# Patient Record
Sex: FEMALE | Race: WHITE | NOT HISPANIC OR LATINO | Employment: PART TIME | URBAN - METROPOLITAN AREA
[De-identification: names, ages, dates, MRNs, and addresses within clinical notes are randomized per-mention and may not be internally consistent; named-entity substitution may affect disease eponyms.]

---

## 2017-08-15 ENCOUNTER — HOSPITAL ENCOUNTER (OUTPATIENT)
Dept: MAMMOGRAPHY | Facility: CLINIC | Age: 57
Discharge: HOME | End: 2017-08-15
Attending: INTERNAL MEDICINE | Admitting: INTERNAL MEDICINE
Payer: COMMERCIAL

## 2017-08-15 DIAGNOSIS — Z12.31 ENCOUNTER FOR SCREENING MAMMOGRAM FOR HIGH-RISK PATIENT: ICD-10-CM

## 2017-08-15 PROCEDURE — G0202 SCR MAMMO BI INCL CAD: HCPCS

## 2017-08-28 ENCOUNTER — TRANSFERRED RECORDS (OUTPATIENT)
Dept: MULTI SPECIALTY CLINIC | Facility: CLINIC | Age: 57
End: 2017-08-28

## 2017-08-28 LAB
ALT SERPL-CCNC: 25 IU/L (ref 8–45)
AST SERPL-CCNC: 23 IU/L (ref 2–40)
CHOLEST SERPL-MCNC: 265 MG/DL (ref 100–199)
CREAT SERPL-MCNC: 0.99 MG/DL (ref 0.57–1.11)
GFR SERPL CREATININE-BSD FRML MDRD: 58 ML/MIN/1.73M2
GLUCOSE SERPL-MCNC: 84 MG/DL (ref 65–100)
HDLC SERPL-MCNC: 53 MG/DL
LDLC SERPL CALC-MCNC: 197 MG/DL
NONHDLC SERPL-MCNC: 212 MG/DL
PAP-ABSTRACT: NORMAL
POTASSIUM SERPL-SCNC: 4.5 MMOL/L (ref 3.5–5)
TRIGL SERPL-MCNC: 75 MG/DL

## 2018-11-13 ENCOUNTER — HOSPITAL ENCOUNTER (OUTPATIENT)
Dept: MAMMOGRAPHY | Facility: CLINIC | Age: 58
Discharge: HOME | End: 2018-11-13
Attending: INTERNAL MEDICINE | Admitting: INTERNAL MEDICINE
Payer: COMMERCIAL

## 2018-11-13 DIAGNOSIS — Z12.31 VISIT FOR SCREENING MAMMOGRAM: ICD-10-CM

## 2018-11-13 PROCEDURE — 77063 BREAST TOMOSYNTHESIS BI: CPT

## 2019-11-19 ENCOUNTER — HOSPITAL ENCOUNTER (OUTPATIENT)
Dept: MAMMOGRAPHY | Facility: CLINIC | Age: 59
Discharge: HOME | End: 2019-11-19
Attending: INTERNAL MEDICINE | Admitting: INTERNAL MEDICINE
Payer: COMMERCIAL

## 2019-11-19 DIAGNOSIS — Z12.31 VISIT FOR SCREENING MAMMOGRAM: ICD-10-CM

## 2019-11-19 PROCEDURE — 77063 BREAST TOMOSYNTHESIS BI: CPT

## 2020-01-19 PROBLEM — R87.619 ABNORMAL PAP SMEAR: Status: ACTIVE | Noted: 2020-01-19

## 2020-01-19 PROBLEM — E78.5 HYPERLIPIDEMIA LDL GOAL <160: Status: ACTIVE | Noted: 2019-01-21

## 2020-01-19 RX ORDER — PHENOL 1.4 %
10 AEROSOL, SPRAY (ML) MUCOUS MEMBRANE
COMMUNITY
Start: 2019-01-17

## 2020-01-19 RX ORDER — ROSUVASTATIN CALCIUM 5 MG/1
5 TABLET, COATED ORAL DAILY
Refills: 3 | COMMUNITY
Start: 2019-11-01 | End: 2021-01-04

## 2020-01-20 ENCOUNTER — OFFICE VISIT (OUTPATIENT)
Dept: FAMILY MEDICINE | Facility: CLINIC | Age: 60
End: 2020-01-20
Payer: COMMERCIAL

## 2020-01-20 VITALS
RESPIRATION RATE: 18 BRPM | WEIGHT: 169 LBS | TEMPERATURE: 99 F | HEART RATE: 73 BPM | OXYGEN SATURATION: 98 % | DIASTOLIC BLOOD PRESSURE: 68 MMHG | SYSTOLIC BLOOD PRESSURE: 106 MMHG

## 2020-01-20 DIAGNOSIS — Z23 NEED FOR SHINGLES VACCINE: ICD-10-CM

## 2020-01-20 DIAGNOSIS — G47.00 INSOMNIA, UNSPECIFIED TYPE: ICD-10-CM

## 2020-01-20 DIAGNOSIS — E78.5 HYPERLIPIDEMIA LDL GOAL <160: ICD-10-CM

## 2020-01-20 DIAGNOSIS — F41.1 GAD (GENERALIZED ANXIETY DISORDER): Primary | ICD-10-CM

## 2020-01-20 PROCEDURE — 90471 IMMUNIZATION ADMIN: CPT | Performed by: FAMILY MEDICINE

## 2020-01-20 PROCEDURE — 99204 OFFICE O/P NEW MOD 45 MIN: CPT | Mod: 25 | Performed by: FAMILY MEDICINE

## 2020-01-20 PROCEDURE — 90750 HZV VACC RECOMBINANT IM: CPT | Performed by: FAMILY MEDICINE

## 2020-01-20 NOTE — PROGRESS NOTES
Subjective     Brenda H Liddle is a 59 year old female who presents to clinic today for the following health issues:    HPI   New Patient/Transfer of Care    On Fluoxetine for COLT/Depression.  Has had stressful events in her life.   had stroke in 2018.    has drinking/acohol issues that he does not like to address or see AA about.    On statin for HLD. Got side effects with another statin, so had to switch to Crestor--much better tolerated. And lipids have been very well controlled.   Takes Melatonin for insomnia.      Reviewed and updated as needed this visit by Provider  Tobacco  Allergies  Meds  Problems  Med Hx  Surg Hx  Fam Hx         Review of Systems   ROS COMP: CONSTITUTIONAL: NEGATIVE for fever, chills, change in weight  INTEGUMENTARY/SKIN: NEGATIVE for worrisome rashes, moles or lesions  EYES: NEGATIVE for vision changes or irritation  ENT/MOUTH: NEGATIVE for ear, mouth and throat problems  RESP: NEGATIVE for significant cough or SOB  CV: NEGATIVE for chest pain, palpitations or peripheral edema  GI: NEGATIVE for nausea, abdominal pain, heartburn, or change in bowel habits  : NEGATIVE for frequency, dysuria, or hematuria  MUSCULOSKELETAL: NEGATIVE for significant arthralgias or myalgia  NEURO: NEGATIVE for weakness, dizziness or paresthesias  HEME: NEGATIVE for bleeding problems  PSYCHIATRIC: NEGATIVE for changes in mood or affect      Objective    /68   Pulse 73   Temp 99  F (37.2  C) (Tympanic)   Resp 18   Wt 76.7 kg (169 lb)   SpO2 98%   There is no height or weight on file to calculate BMI.  Physical Exam   GENERAL: healthy, alert and no distress  EYES: Eyes grossly normal to inspection, PERRL and conjunctivae and sclerae normal  HENT: ear canals and TM's normal, nose and mouth without ulcers or lesions  NECK: no adenopathy, no asymmetry, masses, or scars and thyroid normal to palpation  RESP: lungs clear to auscultation - no rales, rhonchi or wheezes  CV: regular rate  and rhythm, normal S1 S2, no S3 or S4, no murmur, click or rub, no peripheral edema and peripheral pulses strong  ABDOMEN: soft, nontender, no hepatosplenomegaly, no masses and bowel sounds normal  MS: no gross musculoskeletal defects noted, no edema  SKIN: no suspicious lesions or rashes  NEURO: Normal strength and tone, mentation intact and speech normal  PSYCH: mentation appears normal, affect normal/bright    Diagnostic Test Results:  Labs reviewed in Epic        Assessment & Plan   Problem List Items Addressed This Visit     Hyperlipidemia LDL goal <160    Relevant Medications    rosuvastatin (CRESTOR) 5 MG tablet    Insomnia, unspecified type    COLT (generalized anxiety disorder) - Primary    Relevant Medications    FLUoxetine (PROZAC) 20 MG capsule      Other Visit Diagnoses     Need for shingles vaccine        Relevant Orders    ADMIN 1st VACCINE (Completed)    ZOSTER VACCINE RECOMBINANT ADJUVANTED IM NJX (Completed)         COLT/Depression with insomnia: stable  Continue Fluoxetine (takes 20 mg every other day and it works well for her), will let me know if refills are needed.  RTC in 6 months to re-check med routeinly    On Melatonin for insomnia which helps.    HLD: stable  On Rx Crestor  Last Lipids (10/2019) looked normal    Shingles #2 administered today.     Pap Smear result requested to be abstracted.      See Patient Instructions  Return in about 6 months (around 7/20/2020) for re-check / follow-up, med check.    Steph Flores, [facility]

## 2020-01-20 NOTE — NURSING NOTE
Prior to immunization administration, verified patients identity using patient s name and date of birth. Please see Immunization Activity for additional information.     Screening Questionnaire for Adult Immunization    Are you sick today?   No   Do you have allergies to medications, food, a vaccine component or latex?   No   Have you ever had a serious reaction after receiving a vaccination?   No   Do you have a long-term health problem with heart, lung, kidney, or metabolic disease (e.g., diabetes), asthma, a blood disorder, no spleen, complement component deficiency, a cochlear implant, or a spinal fluid leak?  Are you on long-term aspirin therapy?   No   Do you have cancer, leukemia, HIV/AIDS, or any other immune system problem?   No   Do you have a parent, brother, or sister with an immune system problem?   No   In the past 3 months, have you taken medications that affect  your immune system, such as prednisone, other steroids, or anticancer drugs; drugs for the treatment of rheumatoid arthritis, Crohn s disease, or psoriasis; or have you had radiation treatments?   No   Have you had a seizure, or a brain or other nervous system problem?   No   During the past year, have you received a transfusion of blood or blood    products, or been given immune (gamma) globulin or antiviral drug?   No   For women: Are you pregnant or is there a chance you could become       pregnant during the next month?   No   Have you received any vaccinations in the past 4 weeks?   No     Immunization questionnaire answers were all negative.        Per orders of Dr. Flores, injection of Shingrix given by Una Allen CMA. Patient instructed to remain in clinic for 15 minutes afterwards, and to report any adverse reaction to me immediately.       Screening performed by Una Allen CMA on 1/20/2020 at 1:20 PM.

## 2020-11-22 ENCOUNTER — HEALTH MAINTENANCE LETTER (OUTPATIENT)
Age: 60
End: 2020-11-22

## 2020-11-23 ENCOUNTER — HOSPITAL ENCOUNTER (OUTPATIENT)
Dept: MAMMOGRAPHY | Facility: CLINIC | Age: 60
Discharge: HOME | End: 2020-11-23
Attending: FAMILY MEDICINE | Admitting: FAMILY MEDICINE
Payer: COMMERCIAL

## 2020-11-23 DIAGNOSIS — Z12.31 VISIT FOR SCREENING MAMMOGRAM: ICD-10-CM

## 2020-11-23 PROCEDURE — 77063 BREAST TOMOSYNTHESIS BI: CPT

## 2020-12-29 RX ORDER — ROSUVASTATIN CALCIUM 5 MG/1
5 TABLET, COATED ORAL DAILY
Refills: 3 | OUTPATIENT
Start: 2020-12-29

## 2020-12-29 NOTE — TELEPHONE ENCOUNTER
Routing refill request to provider for review/approval because:  Labs not current:  ldl    please route to  team if patient needs an appointment     Carlee NASH RN BSN  [facility]  [phone]

## 2021-01-27 ENCOUNTER — NURSE TRIAGE (OUTPATIENT)
Dept: NURSING | Facility: CLINIC | Age: 61
End: 2021-01-27

## 2021-01-27 NOTE — TELEPHONE ENCOUNTER
Mariama is going to establish with Dr. Chapin. Today she is reporting that she is having abdominal pain. She had pain 6-8 out of 10 the other night. The pain has lessened somewhat but is not gone. She has bloating and tenderness on the right side of the abdomen. She denies fever, vomiting but does report dark urine as well and she is drinking  Plenty of fluids. Send her to scheduling,   Per protocol she should be seen today in office.   Additional Information    Patient wants to be seen    Protocols used: ABDOMINAL PAIN - FEMALE-A-OH

## 2021-01-28 ENCOUNTER — APPOINTMENT (OUTPATIENT)
Dept: CT IMAGING | Facility: CLINIC | Age: 61
End: 2021-01-28
Attending: EMERGENCY MEDICINE
Payer: COMMERCIAL

## 2021-01-28 ENCOUNTER — HOSPITAL ENCOUNTER (OUTPATIENT)
Facility: CLINIC | Age: 61
Setting detail: OBSERVATION
Discharge: HOME | End: 2021-01-29
Attending: EMERGENCY MEDICINE | Admitting: SURGERY
Payer: COMMERCIAL

## 2021-01-28 ENCOUNTER — OFFICE VISIT (OUTPATIENT)
Dept: FAMILY MEDICINE | Facility: CLINIC | Age: 61
End: 2021-01-28
Payer: COMMERCIAL

## 2021-01-28 VITALS
SYSTOLIC BLOOD PRESSURE: 120 MMHG | OXYGEN SATURATION: 96 % | DIASTOLIC BLOOD PRESSURE: 80 MMHG | HEART RATE: 61 BPM | WEIGHT: 169.2 LBS | TEMPERATURE: 98 F

## 2021-01-28 DIAGNOSIS — R10.31 RIGHT LOWER QUADRANT PAIN: Primary | ICD-10-CM

## 2021-01-28 DIAGNOSIS — K35.30 ACUTE APPENDICITIS WITH LOCALIZED PERITONITIS, WITHOUT PERFORATION, ABSCESS, OR GANGRENE: ICD-10-CM

## 2021-01-28 LAB
ALBUMIN SERPL-MCNC: 3.3 G/DL (ref 3.4–5)
ALBUMIN UR-MCNC: NEGATIVE MG/DL
ALP SERPL-CCNC: 104 U/L (ref 40–150)
ALT SERPL W P-5'-P-CCNC: 42 U/L (ref 0–50)
ANION GAP SERPL CALCULATED.3IONS-SCNC: 6 MMOL/L (ref 3–14)
APPEARANCE UR: CLEAR
AST SERPL W P-5'-P-CCNC: 26 U/L (ref 0–45)
BACTERIA #/AREA URNS HPF: ABNORMAL /HPF
BASOPHILS # BLD AUTO: 0.1 10E9/L (ref 0–0.2)
BASOPHILS NFR BLD AUTO: 0.7 %
BILIRUB SERPL-MCNC: 0.2 MG/DL (ref 0.2–1.3)
BILIRUB UR QL STRIP: NEGATIVE
BUN SERPL-MCNC: 16 MG/DL (ref 7–30)
CALCIUM SERPL-MCNC: 9.3 MG/DL (ref 8.5–10.1)
CHLORIDE SERPL-SCNC: 105 MMOL/L (ref 94–109)
CO2 SERPL-SCNC: 28 MMOL/L (ref 20–32)
COLOR UR AUTO: YELLOW
CREAT SERPL-MCNC: 0.93 MG/DL (ref 0.52–1.04)
DIFFERENTIAL METHOD BLD: NORMAL
EOSINOPHIL # BLD AUTO: 0.2 10E9/L (ref 0–0.7)
EOSINOPHIL NFR BLD AUTO: 2.5 %
ERYTHROCYTE [DISTWIDTH] IN BLOOD BY AUTOMATED COUNT: 12.9 % (ref 10–15)
GFR SERPL CREATININE-BSD FRML MDRD: 68 ML/MIN/{1.73_M2}
GLUCOSE SERPL-MCNC: 88 MG/DL (ref 70–99)
GLUCOSE UR STRIP-MCNC: NEGATIVE MG/DL
HCT VFR BLD AUTO: 38.3 % (ref 35–47)
HGB BLD-MCNC: 12.4 G/DL (ref 11.7–15.7)
HGB UR QL STRIP: NEGATIVE
IMM GRANULOCYTES # BLD: 0 10E9/L (ref 0–0.4)
IMM GRANULOCYTES NFR BLD: 0.3 %
KETONES UR STRIP-MCNC: NEGATIVE MG/DL
LABORATORY COMMENT REPORT: NORMAL
LEUKOCYTE ESTERASE UR QL STRIP: ABNORMAL
LIPASE SERPL-CCNC: 125 U/L (ref 73–393)
LYMPHOCYTES # BLD AUTO: 1.4 10E9/L (ref 0.8–5.3)
LYMPHOCYTES NFR BLD AUTO: 19.7 %
MCH RBC QN AUTO: 27.3 PG (ref 26.5–33)
MCHC RBC AUTO-ENTMCNC: 32.4 G/DL (ref 31.5–36.5)
MCV RBC AUTO: 84 FL (ref 78–100)
MONOCYTES # BLD AUTO: 0.6 10E9/L (ref 0–1.3)
MONOCYTES NFR BLD AUTO: 8.6 %
MUCOUS THREADS #/AREA URNS LPF: PRESENT /LPF
NEUTROPHILS # BLD AUTO: 4.7 10E9/L (ref 1.6–8.3)
NEUTROPHILS NFR BLD AUTO: 68.2 %
NITRATE UR QL: NEGATIVE
NRBC # BLD AUTO: 0 10*3/UL
NRBC BLD AUTO-RTO: 0 /100
PH UR STRIP: 6.5 PH (ref 5–7)
PLATELET # BLD AUTO: 381 10E9/L (ref 150–450)
POTASSIUM SERPL-SCNC: 3.8 MMOL/L (ref 3.4–5.3)
PROT SERPL-MCNC: 7.1 G/DL (ref 6.8–8.8)
RBC # BLD AUTO: 4.55 10E12/L (ref 3.8–5.2)
RBC #/AREA URNS AUTO: <1 /HPF (ref 0–2)
SARS-COV-2 RNA RESP QL NAA+PROBE: NEGATIVE
SODIUM SERPL-SCNC: 139 MMOL/L (ref 133–144)
SOURCE: ABNORMAL
SP GR UR STRIP: 1.01 (ref 1–1.03)
SPECIMEN SOURCE: NORMAL
SQUAMOUS #/AREA URNS AUTO: <1 /HPF (ref 0–1)
UROBILINOGEN UR STRIP-MCNC: 2 MG/DL (ref 0–2)
WBC # BLD AUTO: 6.9 10E9/L (ref 4–11)
WBC #/AREA URNS AUTO: 5 /HPF (ref 0–5)

## 2021-01-28 PROCEDURE — 80053 COMPREHEN METABOLIC PANEL: CPT | Performed by: EMERGENCY MEDICINE

## 2021-01-28 PROCEDURE — 83690 ASSAY OF LIPASE: CPT | Performed by: EMERGENCY MEDICINE

## 2021-01-28 PROCEDURE — 87635 SARS-COV-2 COVID-19 AMP PRB: CPT | Performed by: EMERGENCY MEDICINE

## 2021-01-28 PROCEDURE — 250N000009 HC RX 250: Performed by: EMERGENCY MEDICINE

## 2021-01-28 PROCEDURE — 99207 PR INPT ADMISSION FROM CLINIC: CPT | Performed by: NURSE PRACTITIONER

## 2021-01-28 PROCEDURE — 81001 URINALYSIS AUTO W/SCOPE: CPT | Performed by: EMERGENCY MEDICINE

## 2021-01-28 PROCEDURE — G0378 HOSPITAL OBSERVATION PER HR: HCPCS

## 2021-01-28 PROCEDURE — 74177 CT ABD & PELVIS W/CONTRAST: CPT

## 2021-01-28 PROCEDURE — 96365 THER/PROPH/DIAG IV INF INIT: CPT

## 2021-01-28 PROCEDURE — 250N000011 HC RX IP 250 OP 636: Performed by: EMERGENCY MEDICINE

## 2021-01-28 PROCEDURE — 96368 THER/DIAG CONCURRENT INF: CPT

## 2021-01-28 PROCEDURE — 99285 EMERGENCY DEPT VISIT HI MDM: CPT | Mod: 25

## 2021-01-28 PROCEDURE — 85025 COMPLETE CBC W/AUTO DIFF WBC: CPT | Performed by: EMERGENCY MEDICINE

## 2021-01-28 PROCEDURE — 258N000003 HC RX IP 258 OP 636: Performed by: SURGERY

## 2021-01-28 RX ORDER — CEFTRIAXONE 2 G/1
2 INJECTION, POWDER, FOR SOLUTION INTRAMUSCULAR; INTRAVENOUS ONCE
Status: COMPLETED | OUTPATIENT
Start: 2021-01-28 | End: 2021-01-28

## 2021-01-28 RX ORDER — ACETAMINOPHEN 325 MG/1
650 TABLET ORAL EVERY 4 HOURS PRN
Status: DISCONTINUED | OUTPATIENT
Start: 2021-01-28 | End: 2021-01-29 | Stop reason: HOSPADM

## 2021-01-28 RX ORDER — LIDOCAINE 40 MG/G
CREAM TOPICAL
Status: DISCONTINUED | OUTPATIENT
Start: 2021-01-28 | End: 2021-01-29 | Stop reason: HOSPADM

## 2021-01-28 RX ORDER — SODIUM CHLORIDE, SODIUM LACTATE, POTASSIUM CHLORIDE, CALCIUM CHLORIDE 600; 310; 30; 20 MG/100ML; MG/100ML; MG/100ML; MG/100ML
INJECTION, SOLUTION INTRAVENOUS CONTINUOUS
Status: DISCONTINUED | OUTPATIENT
Start: 2021-01-28 | End: 2021-01-29 | Stop reason: HOSPADM

## 2021-01-28 RX ORDER — ACETAMINOPHEN 650 MG/1
650 SUPPOSITORY RECTAL EVERY 4 HOURS PRN
Status: DISCONTINUED | OUTPATIENT
Start: 2021-01-28 | End: 2021-01-29 | Stop reason: HOSPADM

## 2021-01-28 RX ORDER — PIPERACILLIN SODIUM, TAZOBACTAM SODIUM 3; .375 G/15ML; G/15ML
3.38 INJECTION, POWDER, LYOPHILIZED, FOR SOLUTION INTRAVENOUS EVERY 6 HOURS
Status: DISCONTINUED | OUTPATIENT
Start: 2021-01-29 | End: 2021-01-29 | Stop reason: HOSPADM

## 2021-01-28 RX ORDER — IOPAMIDOL 755 MG/ML
85 INJECTION, SOLUTION INTRAVASCULAR ONCE
Status: COMPLETED | OUTPATIENT
Start: 2021-01-28 | End: 2021-01-28

## 2021-01-28 RX ORDER — MULTIVIT-MIN/IRON/FOLIC ACID/K 18-600-40
2000 CAPSULE ORAL DAILY
COMMUNITY
End: 2022-04-29

## 2021-01-28 RX ORDER — ONDANSETRON 2 MG/ML
4 INJECTION INTRAMUSCULAR; INTRAVENOUS EVERY 6 HOURS PRN
Status: DISCONTINUED | OUTPATIENT
Start: 2021-01-28 | End: 2021-01-29 | Stop reason: HOSPADM

## 2021-01-28 RX ORDER — CHLORAL HYDRATE 500 MG
1 CAPSULE ORAL DAILY
COMMUNITY
End: 2021-06-22

## 2021-01-28 RX ORDER — ONDANSETRON 4 MG/1
4 TABLET, ORALLY DISINTEGRATING ORAL EVERY 6 HOURS PRN
Status: DISCONTINUED | OUTPATIENT
Start: 2021-01-28 | End: 2021-01-29 | Stop reason: HOSPADM

## 2021-01-28 RX ORDER — HYDROMORPHONE HYDROCHLORIDE 1 MG/ML
0.5 INJECTION, SOLUTION INTRAMUSCULAR; INTRAVENOUS; SUBCUTANEOUS
Status: DISCONTINUED | OUTPATIENT
Start: 2021-01-28 | End: 2021-01-29 | Stop reason: HOSPADM

## 2021-01-28 RX ADMIN — SODIUM CHLORIDE, POTASSIUM CHLORIDE, SODIUM LACTATE AND CALCIUM CHLORIDE: 600; 310; 30; 20 INJECTION, SOLUTION INTRAVENOUS at 21:16

## 2021-01-28 RX ADMIN — CEFTRIAXONE SODIUM 2 G: 2 INJECTION, POWDER, FOR SOLUTION INTRAMUSCULAR; INTRAVENOUS at 19:12

## 2021-01-28 RX ADMIN — IOPAMIDOL 85 ML: 755 INJECTION, SOLUTION INTRAVENOUS at 16:57

## 2021-01-28 RX ADMIN — SODIUM CHLORIDE 64 ML: 9 INJECTION, SOLUTION INTRAVENOUS at 16:57

## 2021-01-28 RX ADMIN — METRONIDAZOLE 500 MG: 500 INJECTION, SOLUTION INTRAVENOUS at 19:54

## 2021-01-28 ASSESSMENT — MIFFLIN-ST. JEOR
SCORE: 1383.73
SCORE: 1374.21

## 2021-01-28 ASSESSMENT — ENCOUNTER SYMPTOMS
ABDOMINAL PAIN: 1
NAUSEA: 0
APPETITE CHANGE: 1
VOMITING: 0
FREQUENCY: 0
DIARRHEA: 0
DYSURIA: 0
FEVER: 0

## 2021-01-28 NOTE — H&P (VIEW-ONLY)
"  History   Chief Complaint  Abdominal Pain    HPI   Brenda H Liddle is a 60 year old female with a history of hyperlipidemia who presents for evaluation of abdominal pain. The patient reports that this pain started 5 days ago after eating pizza for dinner. This pain has been relatively persistent since then; however, it has been decreasing in severity. She was seen by her PCP today and they recommended that she go to the ED for CT imaging. Here, her abdominal pain has somewhat resolved. The patient also endorses a decreased appetite and darker urine. She denies any fever, nausea, vomiting, diarrhea, dysuria, difficulty urinating, or urgency.     Review of Systems   Constitutional: Positive for appetite change (decrease). Negative for fever.   Gastrointestinal: Positive for abdominal pain (resolved). Negative for diarrhea, nausea and vomiting.   Genitourinary: Negative for decreased urine volume, dysuria, frequency and urgency.   All other systems reviewed and are negative.    Allergies  The patient has no known allergies.     Medications  Prozac  Crestor    Past Medical History  Hyperlipidemia  Positive HPV test  Anxiety  Insomnia  Depression    Past Surgical History  [place] teeth extraction    Family History  Anxiety  Heart disease  Stroke  Hyperlipidemia   Gallbladder issues    Social History  Presents to the ED alone.   Negative for tobacco use.  Negative for alcohol use.  Negative for drug use.    Physical Exam     Patient Vitals for the past 24 hrs:   BP Temp Temp src Pulse Resp SpO2 Height Weight   01/28/21 1602 (!) 145/63 98.5  F (36.9  C) Temporal 70 18 100 % 1.702 m (5' 7\") 76.7 kg (169 lb)     Physical Exam  Constitutional: Alert, attentive, GCS 15  HENT:    Nose: Nose normal.    Mouth/Throat: Oropharynx is clear, mucous membranes are moist  Eyes: EOM are normal, anicteric, conjugate gaze  CV: regular rate and rhythm; no murmurs  Chest: Effort normal and breath sounds clear without wheezing or rales, " symmetric bilaterally   GI:  RLQ tender to palpation. No distension. No guarding or rebound.    MSK: No LE edema, no tenderness to palpation of BLE.  Neurological: Alert, attentive, moving all extremities equally.   Skin: Skin is warm and dry.    Emergency Department Course   Imaging:  CT Abdomen/Pelvis with IV contrast:   1.  Acute appendicitis. No definite free intraperitoneal air or abscess.   2.  Cholelithiasis. No gallbladder inflammation.   3.  No urinary tract calculi or hydronephrosis. as per radiology.    Laboratory:  CBC: WBC: 6.9, HGB: 12.4, PLT: 381  CMP: Albumin 3.3 (L), o/w WNL (Creatinine: 0.93)  Lipase: 125    UA with Microscopic: leukocyte esterase small (A), bacteria few (A), mucous present (A), o/w WNL      Emergency Department Course:  Reviewed:  I reviewed nursing notes, vitals and past medical history    Assessments:   I physically examined the patient as documented above.     I rechecked the patient and updated her on the findings of her workup.     Consults:    I consulted with Dr. Wellington, on call general surgeon, regarding the patient's history and presentation here in the emergency department.    Interventions:  Medications   cefTRIAXone (ROCEPHIN) 2 g vial to attach to  ml bag for ADULTS or NS 50 ml bag for PEDS (has no administration in time range)   metroNIDAZOLE (FLAGYL) infusion 500 mg (has no administration in time range)   Saline Flush - CT (64 mLs Intravenous Given 21)   iopamidol (ISOVUE-370) solution 85 mL (85 mLs Intravenous Given 21)         Disposition:  The patient was admitted to the hospital under the care of Dr. Wellington.     Impression & Plan   Medical Decision Makin-year-old woman no significant past medical history presenting for 5 days right lower quadrant abdominal pain.  She has some anorexia but no fevers, no chills, vomiting or diarrhea.  On exam she has tenderness exactly at McBurney's point concerning for acute  appendicitis with lower suspicion for diverticulitis or kidney stone.  CT imaging shows uncomplicated appendicitis.  She was given ceftriaxone and Flagyl, discussed with general surgery plan for admission to their service with operative intervention in the morning.  She has not had anything here for pain control or antiemetics.    Diagnosis:    ICD-10-CM    1. Acute appendicitis with localized peritonitis, without perforation, abscess, or gangrene  K35.30        Alex Gutierrez MD  [employer]  7:13 PM 01/28/21     Scribe Disclosure:  I, Serafin Glass, am serving as a scribe at 4:05 PM on 1/28/2021 to document services personally performed by Alex Gutierrez MD based on my observations and the provider's statements to me.      Alex Gutierrez MD  01/28/21 1913

## 2021-01-28 NOTE — ED PROVIDER NOTES
"  History   Chief Complaint  Abdominal Pain    HPI   Brenda H Liddle is a 60 year old female with a history of hyperlipidemia who presents for evaluation of abdominal pain. The patient reports that this pain started 5 days ago after eating pizza for dinner. This pain has been relatively persistent since then; however, it has been decreasing in severity. She was seen by her PCP today and they recommended that she go to the ED for CT imaging. Here, her abdominal pain has somewhat resolved. The patient also endorses a decreased appetite and darker urine. She denies any fever, nausea, vomiting, diarrhea, dysuria, difficulty urinating, or urgency.     Review of Systems   Constitutional: Positive for appetite change (decrease). Negative for fever.   Gastrointestinal: Positive for abdominal pain (resolved). Negative for diarrhea, nausea and vomiting.   Genitourinary: Negative for decreased urine volume, dysuria, frequency and urgency.   All other systems reviewed and are negative.    Allergies  The patient has no known allergies.     Medications  Prozac  Crestor    Past Medical History  Hyperlipidemia  Positive HPV test  Anxiety  Insomnia  Depression    Past Surgical History  [place] teeth extraction    Family History  Anxiety  Heart disease  Stroke  Hyperlipidemia   Gallbladder issues    Social History  Presents to the ED alone.   Negative for tobacco use.  Negative for alcohol use.  Negative for drug use.    Physical Exam     Patient Vitals for the past 24 hrs:   BP Temp Temp src Pulse Resp SpO2 Height Weight   01/28/21 1602 (!) 145/63 98.5  F (36.9  C) Temporal 70 18 100 % 1.702 m (5' 7\") 76.7 kg (169 lb)     Physical Exam  Constitutional: Alert, attentive, GCS 15  HENT:    Nose: Nose normal.    Mouth/Throat: Oropharynx is clear, mucous membranes are moist  Eyes: EOM are normal, anicteric, conjugate gaze  CV: regular rate and rhythm; no murmurs  Chest: Effort normal and breath sounds clear without wheezing or rales, " symmetric bilaterally   GI:  RLQ tender to palpation. No distension. No guarding or rebound.    MSK: No LE edema, no tenderness to palpation of BLE.  Neurological: Alert, attentive, moving all extremities equally.   Skin: Skin is warm and dry.    Emergency Department Course   Imaging:  CT Abdomen/Pelvis with IV contrast:   1.  Acute appendicitis. No definite free intraperitoneal air or abscess.   2.  Cholelithiasis. No gallbladder inflammation.   3.  No urinary tract calculi or hydronephrosis. as per radiology.    Laboratory:  CBC: WBC: 6.9, HGB: 12.4, PLT: 381  CMP: Albumin 3.3 (L), o/w WNL (Creatinine: 0.93)  Lipase: 125    UA with Microscopic: leukocyte esterase small (A), bacteria few (A), mucous present (A), o/w WNL      Emergency Department Course:  Reviewed:  I reviewed nursing notes, vitals and past medical history    Assessments:   I physically examined the patient as documented above.     I rechecked the patient and updated her on the findings of her workup.     Consults:    I consulted with Dr. Wellington, on call general surgeon, regarding the patient's history and presentation here in the emergency department.    Interventions:  Medications   cefTRIAXone (ROCEPHIN) 2 g vial to attach to  ml bag for ADULTS or NS 50 ml bag for PEDS (has no administration in time range)   metroNIDAZOLE (FLAGYL) infusion 500 mg (has no administration in time range)   Saline Flush - CT (64 mLs Intravenous Given 21)   iopamidol (ISOVUE-370) solution 85 mL (85 mLs Intravenous Given 21)         Disposition:  The patient was admitted to the hospital under the care of Dr. Wellington.     Impression & Plan   Medical Decision Makin-year-old woman no significant past medical history presenting for 5 days right lower quadrant abdominal pain.  She has some anorexia but no fevers, no chills, vomiting or diarrhea.  On exam she has tenderness exactly at McBurney's point concerning for acute  appendicitis with lower suspicion for diverticulitis or kidney stone.  CT imaging shows uncomplicated appendicitis.  She was given ceftriaxone and Flagyl, discussed with general surgery plan for admission to their service with operative intervention in the morning.  She has not had anything here for pain control or antiemetics.    Diagnosis:    ICD-10-CM    1. Acute appendicitis with localized peritonitis, without perforation, abscess, or gangrene  K35.30        Alex Gutierrez MD  [employer]  7:13 PM 01/28/21     Scribe Disclosure:  I, Serafin Glass, am serving as a scribe at 4:05 PM on 1/28/2021 to document services personally performed by Alex Gutierrez MD based on my observations and the provider's statements to me.      Alex Gutierrez MD  01/28/21 1913

## 2021-01-28 NOTE — PROGRESS NOTES
Assessment & Plan   Problem List Items Addressed This Visit     None      Visit Diagnoses     Right lower quadrant pain    -  Primary           Concern for appendicitis with peritonitis. Will send to ED for further eval. Attempted to get outpt CT but was unable to schedule.       BALA Boss CNP  [facility] SONIA Leslie is a 60 year old who presents to clinic today for the following health issues     HPI       Chief Complaint   Patient presents with     Abdominal Pain     x 2-3 days      Saturday night, notes pizza and sweets for dinner  No sleep Sat night due to pain  No vomiting, no diarrhea, no fever  No appetite Sunday  Family hx cholecystitis   Worsens with sitting up, lifting legs  Getting better each day  More sensitive on right  Feels bloated  Dark urine, notes less oral intake  Eating bland  Tenderness to RLQ with palpation  LBM today  Discomfort with coughing, laughing, and straing  Similar to previous episode when she ate lots of healthy fat          Review of Systems   Detailed as above      Objective    /80 (BP Location: Left arm, Patient Position: Sitting, Cuff Size: Adult Regular)   Pulse 61   Temp 98  F (36.7  C) (Temporal)   Wt 76.7 kg (169 lb 3.2 oz)   SpO2 96%   Breastfeeding No   There is no height or weight on file to calculate BMI.  Physical Exam  Constitutional:       Appearance: Normal appearance.   Pulmonary:      Effort: Pulmonary effort is normal.   Abdominal:      General: Abdomen is flat. Bowel sounds are normal.      Palpations: Abdomen is soft.      Tenderness: There is abdominal tenderness.      Comments: Point tenderness of right lateral upper abd and RLQ. RLQ was most tender   Neurological:      Mental Status: She is alert.   Psychiatric:         Mood and Affect: Mood normal.            ----- Services Performed by a MEDICAL STUDENT in Presence of ATTENDING Physician-------    Vasiliy Jackson NP Student

## 2021-01-28 NOTE — ED TRIAGE NOTES
Pt has diffuse abdominal pain since Saturday. No appetite, no n/v/d. Went to clinic today and sent here for CT. Having tender touch to RLQ today.

## 2021-01-29 ENCOUNTER — SURGERY (OUTPATIENT)
Age: 61
End: 2021-01-29
Payer: COMMERCIAL

## 2021-01-29 ENCOUNTER — ANESTHESIA EVENT (OUTPATIENT)
Dept: SURGERY | Facility: CLINIC | Age: 61
End: 2021-01-29
Payer: COMMERCIAL

## 2021-01-29 ENCOUNTER — ANESTHESIA (OUTPATIENT)
Dept: SURGERY | Facility: CLINIC | Age: 61
End: 2021-01-29
Payer: COMMERCIAL

## 2021-01-29 VITALS
WEIGHT: 171.1 LBS | HEART RATE: 55 BPM | HEIGHT: 67 IN | TEMPERATURE: 98.1 F | SYSTOLIC BLOOD PRESSURE: 138 MMHG | OXYGEN SATURATION: 100 % | BODY MASS INDEX: 26.85 KG/M2 | RESPIRATION RATE: 12 BRPM | DIASTOLIC BLOOD PRESSURE: 72 MMHG

## 2021-01-29 PROCEDURE — 250N000013 HC RX MED GY IP 250 OP 250 PS 637: Performed by: PHYSICIAN ASSISTANT

## 2021-01-29 PROCEDURE — 258N000001 HC RX 258: Performed by: SURGERY

## 2021-01-29 PROCEDURE — 96376 TX/PRO/DX INJ SAME DRUG ADON: CPT

## 2021-01-29 PROCEDURE — 88304 TISSUE EXAM BY PATHOLOGIST: CPT | Mod: 26 | Performed by: PATHOLOGY

## 2021-01-29 PROCEDURE — 250N000011 HC RX IP 250 OP 636: Performed by: ANESTHESIOLOGY

## 2021-01-29 PROCEDURE — 250N000011 HC RX IP 250 OP 636: Performed by: SURGERY

## 2021-01-29 PROCEDURE — 258N000003 HC RX IP 258 OP 636: Performed by: SURGERY

## 2021-01-29 PROCEDURE — 250N000011 HC RX IP 250 OP 636: Performed by: NURSE ANESTHETIST, CERTIFIED REGISTERED

## 2021-01-29 PROCEDURE — 250N000009 HC RX 250: Performed by: NURSE ANESTHETIST, CERTIFIED REGISTERED

## 2021-01-29 PROCEDURE — 370N000017 HC ANESTHESIA TECHNICAL FEE, PER MIN: Performed by: SURGERY

## 2021-01-29 PROCEDURE — 250N000009 HC RX 250: Performed by: SURGERY

## 2021-01-29 PROCEDURE — 99214 OFFICE O/P EST MOD 30 MIN: CPT | Mod: 57 | Performed by: SURGERY

## 2021-01-29 PROCEDURE — 88304 TISSUE EXAM BY PATHOLOGIST: CPT | Mod: TC | Performed by: SURGERY

## 2021-01-29 PROCEDURE — 710N000009 HC RECOVERY PHASE 1, LEVEL 1, PER MIN: Performed by: SURGERY

## 2021-01-29 PROCEDURE — 272N000001 HC OR GENERAL SUPPLY STERILE: Performed by: SURGERY

## 2021-01-29 PROCEDURE — 360N000076 HC SURGERY LEVEL 3, PER MIN: Performed by: SURGERY

## 2021-01-29 PROCEDURE — 96375 TX/PRO/DX INJ NEW DRUG ADDON: CPT | Mod: 59

## 2021-01-29 PROCEDURE — G0378 HOSPITAL OBSERVATION PER HR: HCPCS

## 2021-01-29 PROCEDURE — 44970 LAPAROSCOPY APPENDECTOMY: CPT | Mod: AS | Performed by: PHYSICIAN ASSISTANT

## 2021-01-29 PROCEDURE — 710N000012 HC RECOVERY PHASE 2, PER MINUTE: Performed by: SURGERY

## 2021-01-29 PROCEDURE — 250N000025 HC SEVOFLURANE, PER MIN: Performed by: SURGERY

## 2021-01-29 PROCEDURE — 44970 LAPAROSCOPY APPENDECTOMY: CPT | Performed by: SURGERY

## 2021-01-29 PROCEDURE — 999N000141 HC STATISTIC PRE-PROCEDURE NURSING ASSESSMENT: Performed by: SURGERY

## 2021-01-29 RX ORDER — EPHEDRINE SULFATE 50 MG/ML
INJECTION, SOLUTION INTRAMUSCULAR; INTRAVENOUS; SUBCUTANEOUS PRN
Status: DISCONTINUED | OUTPATIENT
Start: 2021-01-29 | End: 2021-01-29

## 2021-01-29 RX ORDER — DEXAMETHASONE SODIUM PHOSPHATE 4 MG/ML
INJECTION, SOLUTION INTRA-ARTICULAR; INTRALESIONAL; INTRAMUSCULAR; INTRAVENOUS; SOFT TISSUE PRN
Status: DISCONTINUED | OUTPATIENT
Start: 2021-01-29 | End: 2021-01-29

## 2021-01-29 RX ORDER — FENTANYL CITRATE 50 UG/ML
INJECTION, SOLUTION INTRAMUSCULAR; INTRAVENOUS PRN
Status: DISCONTINUED | OUTPATIENT
Start: 2021-01-29 | End: 2021-01-29

## 2021-01-29 RX ORDER — ONDANSETRON 4 MG/1
4 TABLET, ORALLY DISINTEGRATING ORAL EVERY 30 MIN PRN
Status: DISCONTINUED | OUTPATIENT
Start: 2021-01-29 | End: 2021-01-29 | Stop reason: HOSPADM

## 2021-01-29 RX ORDER — NALOXONE HYDROCHLORIDE 0.4 MG/ML
0.2 INJECTION, SOLUTION INTRAMUSCULAR; INTRAVENOUS; SUBCUTANEOUS
Status: DISCONTINUED | OUTPATIENT
Start: 2021-01-29 | End: 2021-01-29 | Stop reason: HOSPADM

## 2021-01-29 RX ORDER — HYDROMORPHONE HYDROCHLORIDE 1 MG/ML
.3-.5 INJECTION, SOLUTION INTRAMUSCULAR; INTRAVENOUS; SUBCUTANEOUS EVERY 5 MIN PRN
Status: DISCONTINUED | OUTPATIENT
Start: 2021-01-29 | End: 2021-01-29 | Stop reason: HOSPADM

## 2021-01-29 RX ORDER — LIDOCAINE HYDROCHLORIDE 20 MG/ML
INJECTION, SOLUTION INFILTRATION; PERINEURAL PRN
Status: DISCONTINUED | OUTPATIENT
Start: 2021-01-29 | End: 2021-01-29

## 2021-01-29 RX ORDER — FENTANYL CITRATE 50 UG/ML
25-50 INJECTION, SOLUTION INTRAMUSCULAR; INTRAVENOUS
Status: DISCONTINUED | OUTPATIENT
Start: 2021-01-29 | End: 2021-01-29 | Stop reason: HOSPADM

## 2021-01-29 RX ORDER — OXYCODONE HYDROCHLORIDE 5 MG/1
5 TABLET ORAL
Status: COMPLETED | OUTPATIENT
Start: 2021-01-29 | End: 2021-01-29

## 2021-01-29 RX ORDER — NALOXONE HYDROCHLORIDE 0.4 MG/ML
0.4 INJECTION, SOLUTION INTRAMUSCULAR; INTRAVENOUS; SUBCUTANEOUS
Status: DISCONTINUED | OUTPATIENT
Start: 2021-01-29 | End: 2021-01-29 | Stop reason: HOSPADM

## 2021-01-29 RX ORDER — ONDANSETRON 2 MG/ML
INJECTION INTRAMUSCULAR; INTRAVENOUS PRN
Status: DISCONTINUED | OUTPATIENT
Start: 2021-01-29 | End: 2021-01-29

## 2021-01-29 RX ORDER — PROPOFOL 10 MG/ML
INJECTION, EMULSION INTRAVENOUS PRN
Status: DISCONTINUED | OUTPATIENT
Start: 2021-01-29 | End: 2021-01-29

## 2021-01-29 RX ORDER — AMOXICILLIN 250 MG
1-2 CAPSULE ORAL 2 TIMES DAILY PRN
Qty: 20 TABLET | Refills: 0 | Status: SHIPPED | OUTPATIENT
Start: 2021-01-29 | End: 2021-03-10

## 2021-01-29 RX ORDER — ONDANSETRON 2 MG/ML
4 INJECTION INTRAMUSCULAR; INTRAVENOUS EVERY 30 MIN PRN
Status: DISCONTINUED | OUTPATIENT
Start: 2021-01-29 | End: 2021-01-29 | Stop reason: HOSPADM

## 2021-01-29 RX ORDER — PROPOFOL 10 MG/ML
INJECTION, EMULSION INTRAVENOUS CONTINUOUS PRN
Status: DISCONTINUED | OUTPATIENT
Start: 2021-01-29 | End: 2021-01-29

## 2021-01-29 RX ORDER — SODIUM CHLORIDE, SODIUM LACTATE, POTASSIUM CHLORIDE, CALCIUM CHLORIDE 600; 310; 30; 20 MG/100ML; MG/100ML; MG/100ML; MG/100ML
INJECTION, SOLUTION INTRAVENOUS CONTINUOUS
Status: DISCONTINUED | OUTPATIENT
Start: 2021-01-29 | End: 2021-01-29 | Stop reason: HOSPADM

## 2021-01-29 RX ORDER — OXYCODONE HYDROCHLORIDE 5 MG/1
5-10 TABLET ORAL EVERY 4 HOURS PRN
Qty: 12 TABLET | Refills: 0 | Status: SHIPPED | OUTPATIENT
Start: 2021-01-29 | End: 2021-03-10

## 2021-01-29 RX ADMIN — ONDANSETRON 4 MG: 2 INJECTION INTRAMUSCULAR; INTRAVENOUS at 12:00

## 2021-01-29 RX ADMIN — Medication 5 MG: at 09:51

## 2021-01-29 RX ADMIN — PIPERACILLIN SODIUM AND TAZOBACTAM SODIUM 3.38 G: 3; .375 INJECTION, POWDER, LYOPHILIZED, FOR SOLUTION INTRAVENOUS at 02:14

## 2021-01-29 RX ADMIN — PROPOFOL 200 MG: 10 INJECTION, EMULSION INTRAVENOUS at 09:44

## 2021-01-29 RX ADMIN — SODIUM CHLORIDE 1000 ML: 900 IRRIGANT IRRIGATION at 10:00

## 2021-01-29 RX ADMIN — PROPOFOL 30 MCG/KG/MIN: 10 INJECTION, EMULSION INTRAVENOUS at 09:47

## 2021-01-29 RX ADMIN — SUCCINYLCHOLINE CHLORIDE 100 MG: 20 INJECTION, SOLUTION INTRAMUSCULAR; INTRAVENOUS; PARENTERAL at 09:44

## 2021-01-29 RX ADMIN — MIDAZOLAM 2 MG: 1 INJECTION INTRAMUSCULAR; INTRAVENOUS at 09:38

## 2021-01-29 RX ADMIN — DEXAMETHASONE SODIUM PHOSPHATE 4 MG: 4 INJECTION, SOLUTION INTRA-ARTICULAR; INTRALESIONAL; INTRAMUSCULAR; INTRAVENOUS; SOFT TISSUE at 09:57

## 2021-01-29 RX ADMIN — PIPERACILLIN SODIUM AND TAZOBACTAM SODIUM 3.38 G: 3; .375 INJECTION, POWDER, LYOPHILIZED, FOR SOLUTION INTRAVENOUS at 07:53

## 2021-01-29 RX ADMIN — FENTANYL CITRATE 50 MCG: 50 INJECTION, SOLUTION INTRAMUSCULAR; INTRAVENOUS at 10:05

## 2021-01-29 RX ADMIN — LIDOCAINE HYDROCHLORIDE 39 ML: 10; .005 INJECTION, SOLUTION EPIDURAL; INFILTRATION; INTRACAUDAL; PERINEURAL at 10:11

## 2021-01-29 RX ADMIN — ROCURONIUM BROMIDE 20 MG: 10 INJECTION INTRAVENOUS at 10:05

## 2021-01-29 RX ADMIN — ONDANSETRON 4 MG: 2 INJECTION INTRAMUSCULAR; INTRAVENOUS at 10:20

## 2021-01-29 RX ADMIN — SODIUM CHLORIDE, POTASSIUM CHLORIDE, SODIUM LACTATE AND CALCIUM CHLORIDE: 600; 310; 30; 20 INJECTION, SOLUTION INTRAVENOUS at 06:54

## 2021-01-29 RX ADMIN — LIDOCAINE HYDROCHLORIDE 100 MG: 20 INJECTION, SOLUTION INFILTRATION; PERINEURAL at 09:44

## 2021-01-29 RX ADMIN — ROCURONIUM BROMIDE 30 MG: 10 INJECTION INTRAVENOUS at 09:50

## 2021-01-29 RX ADMIN — FENTANYL CITRATE 50 MCG: 0.05 INJECTION, SOLUTION INTRAMUSCULAR; INTRAVENOUS at 11:07

## 2021-01-29 RX ADMIN — OXYCODONE HYDROCHLORIDE 5 MG: 5 TABLET ORAL at 11:27

## 2021-01-29 RX ADMIN — FENTANYL CITRATE 50 MCG: 50 INJECTION, SOLUTION INTRAMUSCULAR; INTRAVENOUS at 09:44

## 2021-01-29 RX ADMIN — SUGAMMADEX 200 MG: 100 INJECTION, SOLUTION INTRAVENOUS at 10:29

## 2021-01-29 NOTE — DISCHARGE INSTRUCTIONS
[facility] - [facility]ULT[facility]  Discharge Instructions: Post-Operative Laparoscopic Appendectomy    ACTIVITY    Expect to feel tired after your surgery.  This will gradually resolve.      Take frequent, short walks and increase your activity gradually.      Avoid strenuous physical activity or heavy lifting greater than 15-20 lbs. for 2-3 weeks.  You may climb stairs.    You may drive without restrictions when you are not using any prescription pain medication and feel comfortable in a car.    You may return to work/school when you are comfortable without any prescription pain medication.    WOUND CARE    You may remove your outer dressing or Band-Aids and shower 48 hours after the surgery.  Pat your incisions dry and leave them open to air.  Re-apply dressing (Band-Aids or gauze/tape) as needed for comfort or drainage.    You may have steri-strips (looks like white tape) on your incision.  You may peel off the steri-strips 2 weeks after your surgery if they have not peeled off on their own.     Do not soak your incisions in a tub or pool for 2 weeks.     Do not apply any lotions, creams, or ointments to your incisions.    A ridge under your incisions is normal and will gradually resolve.    DIET    Start with liquids, then gradually resume your regular diet as tolerated.  Avoid heavy, spicy, and greasy meals for 2-3 days.    Drink plenty of fluids to stay hydrated.    PAIN    Expect some tenderness and discomfort at the incision sites.  Use the prescribed pain medication at your discretion.  Expect gradual resolution of your pain over several days.    You may take ibuprofen with food (unless you have been told not to) or acetaminophen/Tylenol instead of or in addition to your prescribed pain medication.  If you are taking Norco or Percocet, do not take any additional acetaminophen/Tylenol.    Do not drink alcohol or drive while you are taking pain medications.    You may apply ice to your incisions in 20  minute intervals as needed for the next 48 hours.  After that time, consider switching to heat if you prefer.    EXPECTATIONS    Pain medications can cause constipation.  Limit use when possible.  Take over the counter stool softener/stimulant, such as Colace or Senna, 1-2 times a day with plenty of water.  You may take a mild over the counter laxative, such as Miralax or a suppository, as needed.  You make discontinue these medications once you are having regular bowel movements and/or are no longer taking your narcotic pain medication.     You may have shoulder or upper back discomfort due to the gas used in surgery.  This is temporary and should resolve in 48-72 hours.  Short, frequent walks may help with this.    If you are unable to urinate, or feel as though you are not emptying your bladder adequately, we recommend you call our office and/or seek care at an ER or Urgent Care facility if after hours.    FOLLOW UP    Our office will contact you in approximately 2 weeks to check on your progress and answer any questions you may have.  If you are doing well, you will not need to return for a follow up appointment.  If any concerns are identified over the phone, we will help you make an appointment to see a provider.     If you have not received a phone call, have any questions or concerns, or would like to be seen, please call us at [phone] and ask to speak with our nurse.  We are located at [address] [place] [address].    CALL OUR OFFICE -[phone] IF YOU HAVE:     Chills or fever above 101 F.    Increased redness, warmth, or drainage at your incisions.    Significant bleeding.    Pain not relieved by your pain medication or rest.    Increasing pain after the first 48 hours.    Any other concerns or questions.       Revised September 2020    Same Day Surgery Discharge Instructions for  Sedation and General Anesthesia       It's not unusual to feel dizzy, light-headed or faint  for up to 24 hours after surgery or while taking pain medication.  If you have these symptoms: sit for a few minutes before standing and have someone assist you when you get up to walk or use the bathroom.      You should rest and relax for the next 24 hours. We recommend you make arrangements to have an adult stay with you for at least 24 hours after your discharge.  Avoid hazardous and strenuous activity.      DO NOT DRIVE any vehicle or operate mechanical equipment for 24 hours following the end of your surgery.  Even though you may feel normal, your reactions may be affected by the medication you have received.      Do not drink alcoholic beverages for 24 hours following surgery.       Slowly progress to your regular diet as you feel able. It's not unusual to feel nauseated and/or vomit after receiving anesthesia.  If you develop these symptoms, drink clear liquids (apple juice, ginger ale, broth, 7-up, etc. ) until you feel better.  If your nausea and vomiting persists for 24 hours, please notify your surgeon.        All narcotic pain medications, along with inactivity and anesthesia, can cause constipation. Drinking plenty of liquids and increasing fiber intake will help.      For any questions of a medical nature, call your surgeon.      Do not make important decisions for 24 hours.      If you had general anesthesia, you may have a sore throat for a couple of days related to the breathing tube used during surgery.  You may use Cepacol lozenges to help with this discomfort.  If it worsens or if you develop a fever, contact your surgeon.       If you feel your pain is not well managed with the pain medications prescribed by your surgeon, please contact your surgeon's office to let them know so they can address your concerns.       CoVid 19 Information    We want to give you information regarding Covid. Please consult your primary care provider with any questions you might have.     Patient who have symptoms  (cough, fever, or shortness of breath), need to isolate for 7 days from when symptoms started OR 72 hours after fever resolves (without fever reducing medications) AND improvement of respiratory symptoms (whichever is longer).      Isolate yourself at home (in own room/own bathroom if possible)    Do Not allow any visitors    Do Not go to work or school    Do Not go to [employer],  centers, shopping, or other public places.    Do Not shake hands.    Avoid close and intimate contact with others (hugging, kissing).    Follow CDC recommendations for household cleaning of frequently touched services.     After the initial 7 days, continue to isolate yourself from household members as much as possible. To continue decrease the risk of community spread and exposure, you and any members of your household should limit activities in public for 14 days after starting home isolation.     You can reference the following CDC link for helpful home isolation/care tips:  https://www.cdc.gov/coronavirus/2019-ncov/downloads/10Things.pdf    Protect Others:    Cover Your Mouth and Nose with a mask, disposable tissue or wash cloth to avoid spreading germs to others.    Wash your hands and face frequently with soap and water    Call Your Primary Doctor If: Breathing difficulty develops or you become worse.    For more information about COVID19 and options for caring for yourself at home, please visit the CDC website at https://www.cdc.gov/coronavirus/2019-ncov/about/steps-when-sick.html  For more options for care at [facility], please visit our website at [email or web address]      **If you have questions or concerns about your procedure,   call Dr. Mcdonald at [phone]**

## 2021-01-29 NOTE — ED NOTES
"[facility]  ED Nurse Handoff Report    ED Chief complaint: Abdominal Pain      ED Diagnosis:   Final diagnoses:   Acute appendicitis with localized peritonitis, without perforation, abscess, or gangrene       Code Status: Full Code    Allergies: No Known Allergies    Patient Story: Abdominal pain on Saturday and Sunday.  Pain went away.  Seen in the clinic and sent over to r/o appy.  Focused Assessment:  Denies pain. Pain only to palpation.     Treatments and/or interventions provided: IV antibiotics.  Patient's response to treatments and/or interventions: Tolerating    To be done/followed up on inpatient unit:  NA    Does this patient have any cognitive concerns?: NA    Activity level - Baseline/Home:  Independent  Activity Level - Current:   Independent    Patient's Preferred language: English   Needed?: No    Isolation: None  Infection: Not Applicable  Patient tested for COVID 19 prior to admission: YES, negative.   Bariatric?: No    Vital Signs:   Vitals:    01/28/21 1602   BP: (!) 145/63   Pulse: 70   Resp: 18   Temp: 98.5  F (36.9  C)   TempSrc: Temporal   SpO2: 100%   Weight: 76.7 kg (169 lb)   Height: 1.702 m (5' 7\")       Cardiac Rhythm:     Was the PSS-3 completed:   Yes  What interventions are required if any?               Family Comments: .  OBS brochure/video discussed/provided to patient/family: Yes              Name of person given brochure if not patient: NA              Relationship to patient: NA    For the majority of the shift this patient's behavior was Green.   Behavioral interventions performed were NA.    ED NURSE PHONE NUMBER: [phone]    Pearl Mcdonnell RN, BSN  Emergency Department  [facility]         "

## 2021-01-29 NOTE — PLAN OF CARE
A&Ox4. VSS on RA. Denies pain overnight. Denies N/V/D. Voiding. LR running @ 100/hr, receiving Zosyn. NPO w/ ice chips. Up IND. Plan for lap Appendectomy today.

## 2021-01-29 NOTE — PHARMACY-ADMISSION MEDICATION HISTORY
Pharmacy Medication History  Admission medication history interview status for the 1/28/2021  admission is complete. See EPIC admission navigator for prior to admission medications     Location of Interview: Phone  Medication history sources: Patient and Surescripts  Medication history source reliability: Good  Adherence assessment: Good    Significant changes made to the medication list:  Modified fluoxetine     In the past week, patient estimated taking medication this percent of the time: greater than 90%    Additional medication history information:   none    Medication reconciliation completed by provider prior to medication history? No    Time spent in this activity: 10 minutes      Prior to Admission medications    Medication Sig Last Dose Taking? Auth Provider   calcium carbonate-vitamin D (OS-JOE) 500-400 MG-UNIT tablet Take 1 tablet by mouth daily 1/28/2021 at am Yes Reported, Patient   fish oil-omega-3 fatty acids 1000 MG capsule Take 1 capsule by mouth daily Unsure of dose 1/28/2021 at am Yes Unknown, Entered By History   FLUoxetine (PROZAC) 20 MG capsule Take 20 mg by mouth daily  1/27/2021 at am Yes Reported, Patient   Melatonin 10 MG TABS tablet Take 10 mg by mouth nightly as needed for sleep  prn Yes Reported, Patient   Multiple Vitamins-Minerals (MULTIVITAMIN ADULT PO) Take 1 tablet by mouth daily  1/28/2021 at am Yes Reported, Patient   rosuvastatin (CRESTOR) 5 MG tablet Take 1 tablet (5 mg) by mouth daily 1/27/2021 at pm Yes Rianna Chapin MD   Vitamin D, Cholecalciferol, 50 MCG (2000 UT) CAPS Take 2,000 Units by mouth daily  1/28/2021 at am Yes Reported, Patient

## 2021-01-29 NOTE — PLAN OF CARE
Observation goals PRIOR TO DISCHARGE  Comments:   -diagnostic tests and consults completed and resulted-not met  -vital signs normal or at patient baseline-met   Nurse to notify provider when observation goals have been met and patient is ready for discharge.

## 2021-01-29 NOTE — PROGRESS NOTES
Pre-op report given to Lorena JACOBS.  Writer of this note will hang IV abx prior to her transfer off to pre-op.  Patient with no complaints this morning.  Daughter and spouse to come in to pick-up the patient (as her car is parked in the ED ramp).  Patient has remained NPO since midnight.

## 2021-01-29 NOTE — BRIEF OP NOTE
[facility]    Brief Operative Note    Pre-operative diagnosis: Appendicitis, unspecified appendicitis type [K37]  Post-operative diagnosis Contained perforated acute appendicitis     Procedure: Procedure(s):  APPENDECTOMY, LAPAROSCOPIC  Surgeon: Surgeon(s) and Role:     * Linden Mcdonald MD - Primary     * Will Horowitz PA-C - Assisting  Anesthesia: General   Estimated blood loss: 5cc  Drains: None  Specimens:   ID Type Source Tests Collected by Time Destination   A :  Tissue Appendix SURGICAL PATHOLOGY EXAM Linden Mcdonald MD 1/29/2021 10:22 AM      Findings:   Appendix adhered to pelvic abdominal wall, fallopian tube, omentum and ileum therefore infection was locally contained.   Complications: None.  Implants: * No implants in log *      Will Horowitz PA-C

## 2021-01-29 NOTE — H&P
[facility]  General Surgery Consultation         Linden Mcdonald MD    Brenda H Liddle MRN# [id number]   YOB: 1960 Age: 60 year old      Date of Admission:  1/28/2021  Date of Consult: 1/29/2021         Assessment and Plan:   Patient is a 60 year old female with appendicitis    PLAN:  I discussed the pathophysiology of appendicitis including medical and surgical management. Symptoms have been present for 1 week but CT and exam findings are consistent with acute appendicitis.  I would recommend going forward with laparoscopic appendectomy today. I have also discussed the risks, benefits, complications including but not limited to bleeding, infection, possible injury to the bowel or ureter, possible anastomotic dehiscence, possible post operative abscess, possible postoperative MI, PE, or other anesthetic complications. If one of these complications did arise the patient could require further surgery. The patient thoroughly understood the conversation and will sign consent.        Chief Complaint:     Chief Complaint   Patient presents with     Abdominal Pain          History of Present Illness:   Patient is a 60 year old female who I was asked to see by Dr. Gutierrez for evaluation of appendicitis.The patient describes having symptoms for the last 6 days.  Started with severe pain over the weekend.  The pain improved and actually resolved.  She has had waxing and waning symptoms throughout the week.  Was encouraged to go to the urgent care by her daughter due to ongoing symptoms.  She was sent to the ER with concern of appendicitis.  The pain is mainly located in the RLQ area. The patient rates the pain a 1 out of 10 but is very tender. The pain is exacerbated by activity. The pain is relieved by rest. The patient does not have nausea and vomiting. Patient denies fevers, chills, nausea, vomiting, jaundice, changes in stool or urine, headache, SOB, chest pain.          Physical Exam:  "  Blood pressure 123/69, pulse 59, temperature 98  F (36.7  C), resp. rate 16, height 1.702 m (5' 7\"), weight 77.6 kg (171 lb 1.6 oz), SpO2 97 %, not currently breastfeeding.  171 lbs 1.6 oz  General: Generally appears well.  Psych: Alert and Oriented.  Normal affect  Neurological: grossly intact  Eyes: Sclera clear  Respiratory:  Lungs with good air excursion  Cardiovascular:  Normal peripheral pulses  GI: Abdomen Soft Severe tenderness to palpation RLQ No hernias palpated..  Lymphatic/Hematologic/Immune:  No obvious lymphadenopathy.  Integumentary:  No rashes       Past Medical History:   History reviewed. No pertinent past medical history.       Past Surgical History:     Past Surgical History:   Procedure Laterality Date     wisdom teeth removed            Current Medications:           [Auto Hold] piperacillin-tazobactam  3.375 g Intravenous Q6H     [Auto Hold] sodium chloride (PF)  3 mL Intracatheter Q8H     [Auto Hold] acetaminophen, [Auto Hold] acetaminophen, [Auto Hold] HYDROmorphone, [Auto Hold] lidocaine 4%, [Auto Hold] lidocaine (buffered or not buffered), [Auto Hold] melatonin, [Auto Hold] ondansetron **OR** [Auto Hold] ondansetron, [Auto Hold] sodium chloride (PF)       Home Medications:     Prior to Admission medications    Medication Sig Last Dose Taking? Auth Provider   calcium carbonate-vitamin D (OS-JOE) 500-400 MG-UNIT tablet Take 1 tablet by mouth daily 1/28/2021 at am Yes Reported, Patient   fish oil-omega-3 fatty acids 1000 MG capsule Take 1 capsule by mouth daily Unsure of dose 1/28/2021 at am Yes Unknown, Entered By History   FLUoxetine (PROZAC) 20 MG capsule Take 20 mg by mouth daily  1/27/2021 at am Yes Reported, Patient   Melatonin 10 MG TABS tablet Take 10 mg by mouth nightly as needed for sleep  prn Yes Reported, Patient   Multiple Vitamins-Minerals (MULTIVITAMIN ADULT PO) Take 1 tablet by mouth daily  1/28/2021 at am Yes Reported, Patient   rosuvastatin (CRESTOR) 5 MG tablet Take 1 " tablet (5 mg) by mouth daily 1/27/2021 at pm Yes Dari, Rianna Tran MD   Vitamin D, Cholecalciferol, 50 MCG (2000 UT) CAPS Take 2,000 Units by mouth daily  1/28/2021 at am Yes Reported, Patient          Allergies:   No Known Allergies       Family History:   History reviewed. No pertinent family history.      Social History:   Brenda H Liddle  reports that she has never smoked. She has never used smokeless tobacco. She reports previous alcohol use. She reports that she does not use drugs.        Review of Systems:   The 12 point Review of Systems is negative other than noted in the HPI.       Labs/Imaging   All new lab and imaging data was reviewed.   Linden Mcdonald M.D.  [facility]

## 2021-01-29 NOTE — ANESTHESIA PROCEDURE NOTES
Airway   Date/Time: 1/29/2021 9:47 AM   Patient location during procedure: OR  Staff -   Other Anesthesia Staff: Albaro Cruz  Performed By: SRNA    Consent for Airway   Urgency: elective    Indications and Patient Condition  Indications for airway management: felicitas-procedural  Induction type:intravenousMask difficulty assessment: 0 - not attempted    Final Airway Details  Final airway type: endotracheal airway  Successful airway:ETT - single  Endotracheal Airway Details   ETT size (mm): 7.0  Cuffed: yes  Successful intubation technique: direct laryngoscopy  Grade View of Cords: 1  Adjucts: stylet  Measured from: gums/teeth  Secured at (cm): 21  Secured with: pink tape  Bite block used: None    Post intubation assessment   Placement verified by: capnometry, equal breath sounds and chest rise   Number of attempts at approach: 1  Number of other approaches attempted: 0  Secured with:pink tape  Ease of procedure: easy  Dentition: Intact

## 2021-01-29 NOTE — ANESTHESIA PREPROCEDURE EVALUATION
Anesthesia Pre-Procedure Evaluation    Patient: Brenda H Liddle   MRN: [id number] : 1960        Preoperative Diagnosis: Appendicitis, unspecified appendicitis type [K37]   Procedure : Procedure(s):  APPENDECTOMY, LAPAROSCOPIC     No past medical history on file.   Past Surgical History:   Procedure Laterality Date     wisdom teeth removed        No Known Allergies   Social History     Tobacco Use     Smoking status: Never Smoker     Smokeless tobacco: Never Used   Substance Use Topics     Alcohol use: Not Currently      Wt Readings from Last 1 Encounters:   21 77.6 kg (171 lb 1.6 oz)        Anesthesia Evaluation            ROS/MED HX  ENT/Pulmonary:  - neg pulmonary ROS     Neurologic:  - neg neurologic ROS     Cardiovascular:     (+) Dyslipidemia -----    METS/Exercise Tolerance:     Hematologic:  - neg hematologic  ROS     Musculoskeletal:       GI/Hepatic:     (+) appendicitis,  (-) GERD   Renal/Genitourinary:  - neg Renal ROS     Endo:  - neg endo ROS     Psychiatric/Substance Use:     (+) psychiatric history anxiety and depression     Infectious Disease:       Malignancy:       Other:            Physical Exam    Airway        Mallampati: II   TM distance: > 3 FB   Neck ROM: full   Mouth opening: > 3 cm    Respiratory Devices and Support         Dental  no notable dental history         Cardiovascular   cardiovascular exam normal          Pulmonary   pulmonary exam normal                OUTSIDE LABS:  CBC:   Lab Results   Component Value Date    WBC 6.9 2021    HGB 12.4 2021    HCT 38.3 2021     2021     BMP:   Lab Results   Component Value Date     2021    POTASSIUM 3.8 2021    POTASSIUM 4.5 2017    CHLORIDE 105 2021    CO2 28 2021    BUN 16 2021    CR 0.93 2021    CR 0.99 2017    GLC 88 2021    GLC 84 2017     COAGS: No results found for: PTT, INR, FIBR  POC: No results found for: BGM, HCG,  HCGS  HEPATIC:   Lab Results   Component Value Date    ALBUMIN 3.3 (L) 01/28/2021    PROTTOTAL 7.1 01/28/2021    ALT 42 01/28/2021    AST 26 01/28/2021    ALKPHOS 104 01/28/2021    BILITOTAL 0.2 01/28/2021     OTHER:   Lab Results   Component Value Date    JOE 9.3 01/28/2021    LIPASE 125 01/28/2021       Anesthesia Plan     History & Physical Review      ASA Status:  2. NPO Status:  NPO Appropriate. .  Plan for General with Intravenous and RSI induction. Device: ETT Maintenance will be Balanced.         PONV prophylaxis:  Ondansetron (or other 5HT-3) and Dexamethasone or Solumedrol.       Consents  Anesthesia Plan(s) and associated risks, benefits, and realistic alternatives discussed.    Questions answered and patient/representative(s) expressed understanding.    Discussed with:  Patient.             Postoperative Care  Postoperative pain management: IV analgesics.           Harpreet Alegria, DO, DO

## 2021-01-29 NOTE — ANESTHESIA POSTPROCEDURE EVALUATION
Patient: Brenda H Liddle    Procedure(s):  APPENDECTOMY, LAPAROSCOPIC    Diagnosis:Appendicitis, unspecified appendicitis type [K37]  Diagnosis Additional Information: No value filed.    Anesthesia Type:  General    Note:  Disposition: Outpatient   Postop Pain Control: Uneventful            Sign Out: Well controlled pain   PONV:    Neuro/Psych: Uneventful            Sign Out: Acceptable/Baseline neuro status   Airway/Respiratory: Uneventful            Sign Out: Acceptable/Baseline resp. status   CV/Hemodynamics: Uneventful            Sign Out: Acceptable CV status   Other NRE: NONE   DID A NON-ROUTINE EVENT OCCUR? No         Last vitals:  Vitals:    01/29/21 1100 01/29/21 1112 01/29/21 1115   BP: 134/70  129/71   Pulse: 61 57 74   Resp: 20 12 10   Temp:   37  C (98.6  F)   SpO2: 100% 97% 99%       Electronically Signed By: Franklyn Houston MD  January 29, 2021  11:46 AM

## 2021-01-29 NOTE — ANESTHESIA CARE TRANSFER NOTE
Patient: Mariama CONTEH Liddle    Procedure(s):  APPENDECTOMY, LAPAROSCOPIC    Diagnosis: Appendicitis, unspecified appendicitis type [K37]  Diagnosis Additional Information: No value filed.    Anesthesia Type:   General     Note:    Oropharynx: oropharynx clear of all foreign objects  Level of Consciousness: awake  Oxygen Supplementation: face mask  Level of Supplemental Oxygen: 10  Independent Airway: airway patency satisfactory and stable  Dentition: dentition unchanged  Vital Signs Stable: post-procedure vital signs reviewed and stable  Report to RN Given: handoff report given  Patient transferred to: PACU    Handoff Report: Identifed the Patient, Identified the Reponsible Provider, Reviewed the pertinent medical history, Discussed the surgical course, Reviewed Intra-OP anesthesia mangement and issues during anesthesia, Set expectations for post-procedure period and Allowed opportunity for questions and acknowledgement of understanding      Vitals: (Last set prior to Anesthesia Care Transfer)  CRNA VITALS  1/29/2021 1008 - 1/29/2021 1041      1/29/2021             Resp Rate (observed):  16    EKG:  Sinus rhythm        Electronically Signed By: BALA Daniel CRNA  January 29, 2021  10:41 AM

## 2021-01-29 NOTE — DISCHARGE SUMMARY
Admission Date: 1/28/2021  Dismissal Date: 1/29/2021    Diagnoses:  Patient Active Problem List   Diagnosis     Abnormal Pap smear     Cervical high risk HPV (human papillomavirus) test positive     Hyperlipidemia LDL goal <160     Insomnia, unspecified type     COLT (generalized anxiety disorder)     Acute appendicitis with localized peritonitis, without perforation, abscess, or gangrene       Consultants:  None    Procedures Performed:  laparoscopic appendectomy    Brief Clinical History: Please see the H&P    Hospital Course:  Patient had a non-complicated postoperative course. No significant complications occurred and the patient was ready for dismissal to home after meeting all discharge criteria.      Instructions:  Diet: Return to preoperative diet as tolerated.  Activity: Slowly return to regular activity as tolerated.  Medications: Resume home medications   Followup: 2 weeks in surgical office    Questions answered.    Condition on discharge: Stable

## 2021-01-29 NOTE — PLAN OF CARE
RECEIVING UNIT ED HANDOFF REVIEW    ED Nurse Handoff Report was reviewed by: Heather Avila RN on January 28, 2021 at 8:29 PM

## 2021-01-29 NOTE — OP NOTE
PREOPERATIVE DIAGNOSIS: Acute appendicitis.     POSTOPERATIVE DIAGNOSIS: Acute appendicitis with contained perforation.     PROCEDURE: Laparoscopic appendectomy.     SURGEON: Linedn Mcdonald MD     ASSISTANT: Will Horowitz PA-C    ANESTHESIA: GET.     COMPLICATIONS: None.     BLOOD LOSS: Minimal.     FINDINGS: Acute appendicitis without perforation.     INDICATIONS: Mrs. Liddle presented with appendicitis and is now presenting for laparoscopic appendectomy. I explained the risks, benefits, complications including but not limited to bleeding, infection, possible need to open, possible postop hematoma, seroma, bowel or bladder injury, all which require additional procedures. The patient did agree and did sign consent.   .   DETAILS OF PROCEDURE: The patient was brought to the operating room per anesthesia, placed in supine position, intubated without difficulty. Surgical timeout was then performed, verifying the correct surgeon, site, procedure and patient. All in the room were in agreement. The patient was prepped and draped in the usual fashion using ChloraPrep. 1% and 0.25% Marcaine were injected to the supraumbilical area. Skin incision was made, carried down to the fascia. The anterior fascia was grasped with 2 Kocher's sharply incised. An open Deya technique was used to gain entry into the abdominal cavity. A camera was inserted and revealed no trocar injuries. The abdomen was insufflated with pressure of 15, which the patient tolerated well. Two 5's were placed in the suprapubic and left lower quadrant under direct visualization. The appendix was found. It was very inflamed and covered with omentum. Blunt dissection was performed and a small contained abscess was encountered. 10 ml of purulent fluid was suctioned immediately. The base of the cecum and terminal ileum were normal. A mesenteric window was created at the base of the cecum.  A JOAN blue load was fired across this area without issues. Once this was  done, the appendix was freed with some difficulty due to adhesions to the lateral wall and small bowel. The bowel was meticulously bluntly peeled away without injury. The appendiceal artery was delineated and a JOAN white load was fired across this. No bleeding occurred. The appendix was then placed an EndoCatch bag and removed through the umbilical trocar without difficulty. The area was explored. Staple lines were completely intact. There was no bleeding whatsoever. It was irrigated with saline and suctioned.The pelvis showed no acute findings. All trocars were taken out under direct visualization. The fascia was closed with an 0 Vicryl in figure-of-eight fashion. Marcaine 0.25% injected to all the wounds. They were irrigated and closed with 4-0 Monocryl in subcuticular fashion. Steri strips were applied. The patient tolerated the procedure well and was awoken from anesthesia and transferred to PACU in stable condition. All sponge, instrument, and needle counts were correct at the conclusion of the procedure. A physician assistant was needed for camera operation, retraction, and closure.    SWEETIE PAREDES MD     Please CC to PCP

## 2021-02-01 LAB — COPATH REPORT: NORMAL

## 2021-02-03 ENCOUNTER — TELEPHONE (OUTPATIENT)
Dept: SURGERY | Facility: CLINIC | Age: 61
End: 2021-02-03

## 2021-02-03 NOTE — TELEPHONE ENCOUNTER
Name of caller: Patient    Reason for Call:  Call back  Patient wondering if she is ok to get the first dose of vaccine this week? Or too close to surgery date?    Surgeon:  Dr. Mcdonald    Recent Surgery:  Yes.    If yes, when & what type:  1/29/2021    APPENDECTOMY, LAPAROSCOPIC    Best phone number to reach pt at is: [phone]    Ok to leave a message with medical info? Yes.

## 2021-02-03 NOTE — TELEPHONE ENCOUNTER
Called patient and informed her per Dr. Mcdonald it is okay to receive the vaccination at any time.    She verbalized understanding.    Valentine Hutchison RN-BSN

## 2021-02-17 ENCOUNTER — TELEPHONE (OUTPATIENT)
Dept: OTHER | Facility: CLINIC | Age: 61
End: 2021-02-17

## 2021-02-17 NOTE — TELEPHONE ENCOUNTER
[facility]  Post op call note - Laparoscopic appendectomy    Brenda H Liddle was called for an update regarding her recovery.  She underwent a laparoscopic appendectomy by Dr. Mcdonald on 1/29/21.  Today she tells me she is doing well and denies any complaints.  She currently does not need any pain medications.  She is eating a normal diet and her bowels are regular.  The patient states she is slowly resuming normal activity.  She states her wounds are healing well and the steri strips are on.  She denies any erythema or drainage at her wounds.  The patient denies fever/chills, n/v/d, abdominal pain, changes in urination or BM, or wound concerns.     SPECIMEN(S):   Appendix     FINAL DIAGNOSIS:   Appendix, appendectomy:   - Acute appendicitis with periappendicitis.       She was instructed to remove steri strips and continue to keep wounds clean.  She was advised to advance her activity as tolerated with no heavy lifting > 20 lbs or strenuous exercise x 1-2 weeks.  The patient states all of her questions were answered and she understands our discussion.  She agrees to follow up as needed and to call our office with any concerns.    Will Horowitz PA-C  Please route or send letter to:  Primary Care Provider (PCP)

## 2021-03-10 ENCOUNTER — OFFICE VISIT (OUTPATIENT)
Dept: FAMILY MEDICINE | Facility: CLINIC | Age: 61
End: 2021-03-10
Payer: COMMERCIAL

## 2021-03-10 VITALS
HEIGHT: 66 IN | OXYGEN SATURATION: 98 % | SYSTOLIC BLOOD PRESSURE: 136 MMHG | TEMPERATURE: 98.2 F | DIASTOLIC BLOOD PRESSURE: 70 MMHG | HEART RATE: 61 BPM | BODY MASS INDEX: 26.68 KG/M2 | WEIGHT: 166 LBS

## 2021-03-10 DIAGNOSIS — Z00.00 ROUTINE GENERAL MEDICAL EXAMINATION AT A HEALTH CARE FACILITY: Primary | ICD-10-CM

## 2021-03-10 DIAGNOSIS — Z12.4 SCREENING FOR CERVICAL CANCER: ICD-10-CM

## 2021-03-10 DIAGNOSIS — E78.5 HYPERLIPIDEMIA LDL GOAL <130: ICD-10-CM

## 2021-03-10 DIAGNOSIS — F32.0 MILD MAJOR DEPRESSION (H): ICD-10-CM

## 2021-03-10 PROCEDURE — 87624 HPV HI-RISK TYP POOLED RSLT: CPT | Performed by: INTERNAL MEDICINE

## 2021-03-10 PROCEDURE — G0145 SCR C/V CYTO,THINLAYER,RESCR: HCPCS | Performed by: INTERNAL MEDICINE

## 2021-03-10 PROCEDURE — 99213 OFFICE O/P EST LOW 20 MIN: CPT | Mod: 25 | Performed by: INTERNAL MEDICINE

## 2021-03-10 PROCEDURE — 99396 PREV VISIT EST AGE 40-64: CPT | Performed by: INTERNAL MEDICINE

## 2021-03-10 ASSESSMENT — MIFFLIN-ST. JEOR: SCORE: 1344.72

## 2021-03-10 NOTE — PROGRESS NOTES
SUBJECTIVE:   CC: Brenda H Liddle is an 60 year old woman who presents for preventive health visit.       Patient has been advised of split billing requirements and indicates understanding: Yes  Healthy Habits:    Do you get at least three servings of calcium containing foods daily (dairy, green leafy vegetables, etc.)? yes    Amount of exercise or daily activities, outside of work: none    Problems taking medications regularly No    Medication side effects: No    Have you had an eye exam in the past two years? yes    Do you see a dentist twice per year? Not with covid    Do you have sleep apnea, excessive snoring or daytime drowsiness?no    Mariama is a new patient today. She has a history of intermittent mild major depression. In 2004 after several major life events she was put on Fluoxetine for about a year. In 2018 there was again some situational stressors mostly surrounding her  who had a stroke in the setting of alcoholism, and also her mother falling and being hospitalized, needing to sell the house.       Has been on a statin for a few years. Was first on one that caused her to have diarrhea so she was switched to Crestor and has been on this since 2019.    Mariama has two daughters: 24 y/o, Suzie,  for a [employer] company. 21 y/o daughter who is a nurse through [employer] in [place] in the Oncology unit.   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         Today's PHQ-2 Score:   PHQ-2 ( 1999 Pfizer) 1/28/2021 1/20/2020   Q1: Little interest or pleasure in doing things 0 0   Q2: Feeling down, depressed or hopeless 0 0   PHQ-2 Score 0 0       Abuse: Current or Past(Physical, Sexual or Emotional)- No  Do you feel safe in your environment? Yes    Have you ever done Advance Care Planning? (For example, a Health Directive, POLST, or a discussion with a medical provider or your loved ones about  your wishes): No, advance care planning information given to patient to review.  Patient plans to discuss their wishes with loved ones or provider.      Social History     Tobacco Use     Smoking status: Never Smoker     Smokeless tobacco: Never Used   Substance Use Topics     Alcohol use: Not Currently     If you drink alcohol do you typically have >3 drinks per day or >7 drinks per week? No                     Reviewed orders with patient.  Reviewed health maintenance and updated orders accordingly - Yes  BP Readings from Last 3 Encounters:   03/10/21 136/70   01/29/21 138/72   01/28/21 120/80    Wt Readings from Last 3 Encounters:   03/10/21 75.3 kg (166 lb)   01/28/21 77.6 kg (171 lb 1.6 oz)   01/28/21 76.7 kg (169 lb 3.2 oz)                    Breast CA Risk Screening:  No flowsheet data found.    Mammogram Screening: Recommended mammography every 1-2 years with patient discussion and risk factor consideration  Pertinent mammograms are reviewed under the imaging tab.    Pertinent mammograms are reviewed under the imaging tab.  History of abnormal Pap smear: NO - age 30-65 PAP every 5 years with negative HPV co-testing recommended     Reviewed and updated as needed this visit by clinical staff  Tobacco  Allergies  Meds   Med Hx  Surg Hx  Fam Hx  Soc Hx        Reviewed and updated as needed this visit by Provider                    ROS:  CONSTITUTIONAL: NEGATIVE for fever, chills, change in weight  INTEGUMENTARY/SKIN: NEGATIVE for worrisome rashes, moles or lesions  EYES: NEGATIVE for vision changes or irritation  ENT: NEGATIVE for ear, mouth and throat problems  RESP: NEGATIVE for significant cough or SOB  BREAST: NEGATIVE for masses, tenderness or discharge  CV: NEGATIVE for chest pain, palpitations or peripheral edema  GI: NEGATIVE for nausea, abdominal pain, heartburn, or change in bowel habits  : NEGATIVE for unusual urinary or vaginal symptoms. No vaginal bleeding.  MUSCULOSKELETAL: NEGATIVE for  "significant arthralgias or myalgia  NEURO: NEGATIVE for weakness, dizziness or paresthesias  PSYCHIATRIC: NEGATIVE for changes in mood or affect     OBJECTIVE:   /70 (Cuff Size: Adult Large)   Pulse 61   Temp 98.2  F (36.8  C) (Tympanic)   Ht 1.676 m (5' 6\")   Wt 75.3 kg (166 lb)   SpO2 98%   BMI 26.79 kg/m    EXAM:  GENERAL APPEARANCE: healthy, alert and no distress  EYES: Eyes grossly normal to inspection, PERRL and conjunctivae and sclerae normal  HENT: ear canals and TM's normal, nose and mouth without ulcers or lesions, oropharynx clear and oral mucous membranes moist  NECK: no adenopathy, no asymmetry, masses, or scars and thyroid normal to palpation  RESP: lungs clear to auscultation - no rales, rhonchi or wheezes  BREAST: normal without masses, tenderness or nipple discharge and no palpable axillary masses or adenopathy  CV: regular rate and rhythm, normal S1 S2, no S3 or S4, no murmur, click or rub, no peripheral edema and peripheral pulses strong  ABDOMEN: soft, nontender, no hepatosplenomegaly, no masses and bowel sounds normal   (female): normal female external genitalia, normal urethral meatus, vaginal mucosal atrophy noted, normal cervix, adnexae, and uterus without masses or abnormal discharge  MS: no musculoskeletal defects are noted and gait is age appropriate without ataxia  SKIN: no suspicious lesions or rashes  NEURO: Normal strength and tone, sensory exam grossly normal, mentation intact and speech normal  PSYCH: mentation appears normal and affect normal/bright    Diagnostic Test Results:  Labs reviewed in Epic    ASSESSMENT/PLAN:   1. Routine general medical examination at a health care facility  New patient today, updated medical and family history.  reviewed. Pap smear completed today.   - Glucose; Future  - Hemoglobin; Future    2. Hyperlipidemia LDL goal <130  Continue Crestor 5 mg daily. Fasting labs ordered.   - Lipid panel reflex to direct LDL Fasting; Future  - ALT; " "Future    3. Mild major depression (H)  Doing well on Fluoxetine. No reason to discontinue now as Mariama feels it is working well and still has some things in her life that are causing stress. Reviewed benefits and will readdress either later this summer or summer of 2022, whenever she is ready.     4. Screening for cervical cancer  - HPV High Risk Types DNA Cervical  - Pap imaged thin layer screen with HPV - recommended age 30 - 65 years (select HPV order below)    Patient has been advised of split billing requirements and indicates understanding: Yes  COUNSELING:   Reviewed preventive health counseling, as reflected in patient instructions    Estimated body mass index is 26.8 kg/m  as calculated from the following:    Height as of 1/28/21: 1.702 m (5' 7\").    Weight as of 1/28/21: 77.6 kg (171 lb 1.6 oz).        She reports that she has never smoked. She has never used smokeless tobacco.      Counseling Resources:  ATP IV Guidelines  Pooled Cohorts Equation Calculator  Breast Cancer Risk Calculator  BRCA-Related Cancer Risk Assessment: FHS-7 Tool  FRAX Risk Assessment  ICSI Preventive Guidelines  Dietary Guidelines for Americans, 2010  USDA's MyPlate  ASA Prophylaxis  Lung CA Screening    Rianna Chapin MD  [facility] [place]  "

## 2021-03-15 LAB
COPATH REPORT: NORMAL
PAP: NORMAL

## 2021-03-18 LAB
FINAL DIAGNOSIS: NORMAL
HPV HR 12 DNA CVX QL NAA+PROBE: NEGATIVE
HPV16 DNA SPEC QL NAA+PROBE: NEGATIVE
HPV18 DNA SPEC QL NAA+PROBE: NEGATIVE
SPECIMEN DESCRIPTION: NORMAL
SPECIMEN SOURCE CVX/VAG CYTO: NORMAL

## 2021-03-20 DIAGNOSIS — Z00.00 ROUTINE GENERAL MEDICAL EXAMINATION AT A HEALTH CARE FACILITY: ICD-10-CM

## 2021-03-20 DIAGNOSIS — E78.5 HYPERLIPIDEMIA LDL GOAL <130: ICD-10-CM

## 2021-03-20 LAB
ALT SERPL W P-5'-P-CCNC: 28 U/L (ref 0–50)
CHOLEST SERPL-MCNC: 187 MG/DL
GLUCOSE SERPL-MCNC: 88 MG/DL (ref 70–99)
HDLC SERPL-MCNC: 61 MG/DL
HGB BLD-MCNC: 14.4 G/DL (ref 11.7–15.7)
LDLC SERPL CALC-MCNC: 108 MG/DL
NONHDLC SERPL-MCNC: 126 MG/DL
TRIGL SERPL-MCNC: 92 MG/DL

## 2021-03-20 PROCEDURE — 80061 LIPID PANEL: CPT | Performed by: INTERNAL MEDICINE

## 2021-03-20 PROCEDURE — 84460 ALANINE AMINO (ALT) (SGPT): CPT | Performed by: INTERNAL MEDICINE

## 2021-03-20 PROCEDURE — 36415 COLL VENOUS BLD VENIPUNCTURE: CPT | Performed by: INTERNAL MEDICINE

## 2021-03-20 PROCEDURE — 85018 HEMOGLOBIN: CPT | Performed by: INTERNAL MEDICINE

## 2021-03-20 PROCEDURE — 82947 ASSAY GLUCOSE BLOOD QUANT: CPT | Performed by: INTERNAL MEDICINE

## 2021-03-27 DIAGNOSIS — E78.5 HYPERLIPIDEMIA LDL GOAL <160: ICD-10-CM

## 2021-03-29 DIAGNOSIS — F41.1 GAD (GENERALIZED ANXIETY DISORDER): Primary | ICD-10-CM

## 2021-03-30 RX ORDER — ROSUVASTATIN CALCIUM 5 MG/1
TABLET, COATED ORAL
Qty: 90 TABLET | Refills: 3 | Status: SHIPPED | OUTPATIENT
Start: 2021-03-30 | End: 2022-03-21

## 2021-03-30 NOTE — TELEPHONE ENCOUNTER
Routing refill request to provider for review/approval because:  Medication is reported/historical  Mikayla Escobar RN

## 2021-06-11 DIAGNOSIS — Z11.59 ENCOUNTER FOR SCREENING FOR OTHER VIRAL DISEASES: ICD-10-CM

## 2021-06-22 ENCOUNTER — OFFICE VISIT (OUTPATIENT)
Dept: FAMILY MEDICINE | Facility: CLINIC | Age: 61
End: 2021-06-22
Payer: COMMERCIAL

## 2021-06-22 VITALS
DIASTOLIC BLOOD PRESSURE: 70 MMHG | SYSTOLIC BLOOD PRESSURE: 108 MMHG | OXYGEN SATURATION: 100 % | TEMPERATURE: 97.7 F | WEIGHT: 169 LBS | BODY MASS INDEX: 27.28 KG/M2 | RESPIRATION RATE: 16 BRPM | HEART RATE: 64 BPM

## 2021-06-22 DIAGNOSIS — Z01.818 PREOP GENERAL PHYSICAL EXAM: Primary | ICD-10-CM

## 2021-06-22 DIAGNOSIS — H02.831 DERMATOCHALASIS OF RIGHT UPPER EYELID: ICD-10-CM

## 2021-06-22 DIAGNOSIS — H02.834 DERMATOCHALASIS OF LEFT UPPER EYELID: ICD-10-CM

## 2021-06-22 PROCEDURE — 99214 OFFICE O/P EST MOD 30 MIN: CPT | Performed by: PHYSICIAN ASSISTANT

## 2021-06-22 ASSESSMENT — ANXIETY QUESTIONNAIRES
6. BECOMING EASILY ANNOYED OR IRRITABLE: NOT AT ALL
GAD7 TOTAL SCORE: 0
4. TROUBLE RELAXING: NOT AT ALL
3. WORRYING TOO MUCH ABOUT DIFFERENT THINGS: NOT AT ALL
GAD7 TOTAL SCORE: 0
7. FEELING AFRAID AS IF SOMETHING AWFUL MIGHT HAPPEN: NOT AT ALL
GAD7 TOTAL SCORE: 0
1. FEELING NERVOUS, ANXIOUS, OR ON EDGE: NOT AT ALL
2. NOT BEING ABLE TO STOP OR CONTROL WORRYING: NOT AT ALL
5. BEING SO RESTLESS THAT IT IS HARD TO SIT STILL: NOT AT ALL
7. FEELING AFRAID AS IF SOMETHING AWFUL MIGHT HAPPEN: NOT AT ALL

## 2021-06-22 ASSESSMENT — ENCOUNTER SYMPTOMS
GASTROINTESTINAL NEGATIVE: 1
RESPIRATORY NEGATIVE: 1
PSYCHIATRIC NEGATIVE: 1
CARDIOVASCULAR NEGATIVE: 1
CONSTITUTIONAL NEGATIVE: 1
MUSCULOSKELETAL NEGATIVE: 1
NEUROLOGICAL NEGATIVE: 1

## 2021-06-22 ASSESSMENT — PATIENT HEALTH QUESTIONNAIRE - PHQ9
SUM OF ALL RESPONSES TO PHQ QUESTIONS 1-9: 0
SUM OF ALL RESPONSES TO PHQ QUESTIONS 1-9: 0
10. IF YOU CHECKED OFF ANY PROBLEMS, HOW DIFFICULT HAVE THESE PROBLEMS MADE IT FOR YOU TO DO YOUR WORK, TAKE CARE OF THINGS AT HOME, OR GET ALONG WITH OTHER PEOPLE: NOT DIFFICULT AT ALL

## 2021-06-22 NOTE — PROGRESS NOTES
[address] [address]  Phone: [phone]  Primary Provider: Carie Tovar  Pre-op Performing Provider: LARISSA IVEY      PREOPERATIVE EVALUATION:  Today's date: 6/22/2021    Brenda H Liddle is a 60 year old female who presents for a preoperative evaluation.    Surgical Information:  Surgery/Procedure: Bilateral Upper Lid BLEPHAROPLASTY  Surgery Location: [facility]  Surgeon: Dr Salguero  Surgery Date: 7/6/2021  Time of Surgery: 10:45am  Where patient plans to recover: At home with family  Fax number for surgical facility: Note does not need to be faxed, will be available electronically in Epic.    Type of Anesthesia Anticipated: to be determined    Assessment & Plan     The proposed surgical procedure is considered LOW risk.    Problem List Items Addressed This Visit     None      Visit Diagnoses     Preop general physical exam    -  Primary    Dermatochalasis of right upper eyelid        Dermatochalasis of left upper eyelid            Risks and Recommendations:  The patient has the following additional risks and recommendations for perioperative complications:   - No identified additional risk factors other than previously addressed    Medication Instructions:  Patient is to take all scheduled medications on the day of surgery    RECOMMENDATION:  APPROVAL GIVEN to proceed with proposed procedure, without further diagnostic evaluation.              14 minutes spent on the date of the encounter doing chart review, patient visit and documentation         Subjective     HPI related to upcoming procedure: bilateral dropping eyelids causing vision problems    Preop Questions 6/15/2021   1. Have you ever had a heart attack or stroke? No   2. Have you ever had surgery on your heart or blood vessels, such as a stent placement, a coronary artery bypass, or surgery on an artery in your head, neck, heart, or legs? No   3. Do you have chest  pain with activity? No   4. Do you have a history of  heart failure? No   5. Do you currently have a cold, bronchitis or symptoms of other infection? No   6. Do you have a cough, shortness of breath, or wheezing? No   7. Do you or anyone in your family have previous history of blood clots? No   8. Do you or does anyone in your family have a serious bleeding problem such as prolonged bleeding following surgeries or cuts? No   9. Have you ever had problems with anemia or been told to take iron pills? No   10. Have you had any abnormal blood loss such as black, tarry or bloody stools, or abnormal vaginal bleeding? No   11. Have you ever had a blood transfusion? No   12. Are you willing to have a blood transfusion if it is medically needed before, during, or after your surgery? Yes   13. Have you or any of your relatives ever had problems with anesthesia? No   14. Do you have sleep apnea, excessive snoring or daytime drowsiness? No   15. Do you have any artifical heart valves or other implanted medical devices like a pacemaker, defibrillator, or continuous glucose monitor? No   16. Do you have artificial joints? No   17. Are you allergic to latex? No   18. Is there any chance that you may be pregnant? No       Health Care Directive:  Patient does not have a Health Care Directive or Living Will: Discussed advance care planning with patient; however, patient declined at this time.    Preoperative Review of :   reviewed - controlled substances prescribed by other outside provider(s).          Review of Systems   Constitutional: Negative.    HENT: Negative.    Eyes:        As in HPI   Respiratory: Negative.    Cardiovascular: Negative.    Gastrointestinal: Negative.    Genitourinary: Negative.    Musculoskeletal: Negative.    Skin: Negative.    Neurological: Negative.    Psychiatric/Behavioral: Negative.          Patient Active Problem List    Diagnosis Date Noted     Mild major depression (H) 03/10/2021     Priority:  Medium     Acute appendicitis with localized peritonitis, without perforation, abscess, or gangrene 01/28/2021     Priority: Medium     Insomnia, unspecified type 01/20/2020     Priority: Medium     COLT (generalized anxiety disorder) 01/20/2020     Priority: Medium     Abnormal Pap smear 01/19/2020     Priority: Medium     Overview:   ASCUS       Hyperlipidemia LDL goal <160 01/21/2019     Priority: Medium     Cervical high risk HPV (human papillomavirus) test positive 04/13/2007     Priority: Medium      History reviewed. No pertinent past medical history.  Past Surgical History:   Procedure Laterality Date     LAPAROSCOPIC APPENDECTOMY N/A 1/29/2021    Procedure: APPENDECTOMY, LAPAROSCOPIC;  Surgeon: Linden Mcdonald MD;  Location: [facility] OR     wisdom teeth removed       Current Outpatient Medications   Medication Sig Dispense Refill     calcium carbonate-vitamin D (OS-JOE) 500-400 MG-UNIT tablet Take 1 tablet by mouth daily       FLUoxetine (PROZAC) 20 MG capsule Take 1 capsule (20 mg) by mouth every other day 90 capsule 1     Melatonin 10 MG TABS tablet Take 10 mg by mouth nightly as needed for sleep        Multiple Vitamins-Minerals (MULTIVITAMIN ADULT PO) Take 1 tablet by mouth daily        rosuvastatin (CRESTOR) 5 MG tablet TAKE 1 TABLET(5 MG) BY MOUTH DAILY 90 tablet 3     Vitamin D, Cholecalciferol, 50 MCG (2000 UT) CAPS Take 2,000 Units by mouth daily          No Known Allergies     Social History     Tobacco Use     Smoking status: Never Smoker     Smokeless tobacco: Never Used   Substance Use Topics     Alcohol use: Not Currently       History   Drug Use Unknown         Objective     /70   Pulse 64   Temp 97.7  F (36.5  C)   Resp 16   Wt 76.7 kg (169 lb)   SpO2 100%   BMI 27.28 kg/m      Physical Exam  Constitutional:       General: She is not in acute distress.     Appearance: She is well-developed. She is not diaphoretic.   HENT:      Head: Normocephalic.      Right Ear: External ear  normal.      Left Ear: External ear normal.      Nose: Nose normal.   Eyes:      Conjunctiva/sclera: Conjunctivae normal.   Neck:      Musculoskeletal: Normal range of motion.   Cardiovascular:      Rate and Rhythm: Normal rate and regular rhythm.      Heart sounds: Normal heart sounds.   Pulmonary:      Effort: Pulmonary effort is normal.      Breath sounds: Normal breath sounds.   Skin:     General: Skin is warm and dry.   Neurological:      Mental Status: She is alert and oriented to person, place, and time.   Psychiatric:         Judgment: Judgment normal.           Recent Labs   Lab Test 03/20/21  0929 01/28/21  1638   HGB 14.4 12.4   PLT  --  381   NA  --  139   POTASSIUM  --  3.8   CR  --  0.93        Diagnostics:  No labs were ordered during this visit.   No EKG required for low risk surgery (cataract, skin procedure, breast biopsy, etc).    Revised Cardiac Risk Index (RCRI):  The patient has the following serious cardiovascular risks for perioperative complications:   - No serious cardiac risks = 0 points     RCRI Interpretation: 0 points: Class I (very low risk - 0.4% complication rate)           Signed Electronically by: Vesta Spain PA-C  Copy of this evaluation report is provided to requesting physician.

## 2021-06-22 NOTE — H&P (VIEW-ONLY)
[address] [address]  Phone: [phone]  Primary Provider: Carie Tovar  Pre-op Performing Provider: LARISSA IVEY      PREOPERATIVE EVALUATION:  Today's date: 6/22/2021    Brenda H Liddle is a 60 year old female who presents for a preoperative evaluation.    Surgical Information:  Surgery/Procedure: Bilateral Upper Lid BLEPHAROPLASTY  Surgery Location: [facility]  Surgeon: Dr Salguero  Surgery Date: 7/6/2021  Time of Surgery: 10:45am  Where patient plans to recover: At home with family  Fax number for surgical facility: Note does not need to be faxed, will be available electronically in Epic.    Type of Anesthesia Anticipated: to be determined    Assessment & Plan     The proposed surgical procedure is considered LOW risk.    Problem List Items Addressed This Visit     None      Visit Diagnoses     Preop general physical exam    -  Primary    Dermatochalasis of right upper eyelid        Dermatochalasis of left upper eyelid            Risks and Recommendations:  The patient has the following additional risks and recommendations for perioperative complications:   - No identified additional risk factors other than previously addressed    Medication Instructions:  Patient is to take all scheduled medications on the day of surgery    RECOMMENDATION:  APPROVAL GIVEN to proceed with proposed procedure, without further diagnostic evaluation.              14 minutes spent on the date of the encounter doing chart review, patient visit and documentation         Subjective     HPI related to upcoming procedure: bilateral dropping eyelids causing vision problems    Preop Questions 6/15/2021   1. Have you ever had a heart attack or stroke? No   2. Have you ever had surgery on your heart or blood vessels, such as a stent placement, a coronary artery bypass, or surgery on an artery in your head, neck, heart, or legs? No   3. Do you have chest  pain with activity? No   4. Do you have a history of  heart failure? No   5. Do you currently have a cold, bronchitis or symptoms of other infection? No   6. Do you have a cough, shortness of breath, or wheezing? No   7. Do you or anyone in your family have previous history of blood clots? No   8. Do you or does anyone in your family have a serious bleeding problem such as prolonged bleeding following surgeries or cuts? No   9. Have you ever had problems with anemia or been told to take iron pills? No   10. Have you had any abnormal blood loss such as black, tarry or bloody stools, or abnormal vaginal bleeding? No   11. Have you ever had a blood transfusion? No   12. Are you willing to have a blood transfusion if it is medically needed before, during, or after your surgery? Yes   13. Have you or any of your relatives ever had problems with anesthesia? No   14. Do you have sleep apnea, excessive snoring or daytime drowsiness? No   15. Do you have any artifical heart valves or other implanted medical devices like a pacemaker, defibrillator, or continuous glucose monitor? No   16. Do you have artificial joints? No   17. Are you allergic to latex? No   18. Is there any chance that you may be pregnant? No       Health Care Directive:  Patient does not have a Health Care Directive or Living Will: Discussed advance care planning with patient; however, patient declined at this time.    Preoperative Review of :   reviewed - controlled substances prescribed by other outside provider(s).          Review of Systems   Constitutional: Negative.    HENT: Negative.    Eyes:        As in HPI   Respiratory: Negative.    Cardiovascular: Negative.    Gastrointestinal: Negative.    Genitourinary: Negative.    Musculoskeletal: Negative.    Skin: Negative.    Neurological: Negative.    Psychiatric/Behavioral: Negative.          Patient Active Problem List    Diagnosis Date Noted     Mild major depression (H) 03/10/2021     Priority:  Medium     Acute appendicitis with localized peritonitis, without perforation, abscess, or gangrene 01/28/2021     Priority: Medium     Insomnia, unspecified type 01/20/2020     Priority: Medium     COLT (generalized anxiety disorder) 01/20/2020     Priority: Medium     Abnormal Pap smear 01/19/2020     Priority: Medium     Overview:   ASCUS       Hyperlipidemia LDL goal <160 01/21/2019     Priority: Medium     Cervical high risk HPV (human papillomavirus) test positive 04/13/2007     Priority: Medium      History reviewed. No pertinent past medical history.  Past Surgical History:   Procedure Laterality Date     LAPAROSCOPIC APPENDECTOMY N/A 1/29/2021    Procedure: APPENDECTOMY, LAPAROSCOPIC;  Surgeon: Linden Mcdonald MD;  Location: [facility] OR     wisdom teeth removed       Current Outpatient Medications   Medication Sig Dispense Refill     calcium carbonate-vitamin D (OS-JOE) 500-400 MG-UNIT tablet Take 1 tablet by mouth daily       FLUoxetine (PROZAC) 20 MG capsule Take 1 capsule (20 mg) by mouth every other day 90 capsule 1     Melatonin 10 MG TABS tablet Take 10 mg by mouth nightly as needed for sleep        Multiple Vitamins-Minerals (MULTIVITAMIN ADULT PO) Take 1 tablet by mouth daily        rosuvastatin (CRESTOR) 5 MG tablet TAKE 1 TABLET(5 MG) BY MOUTH DAILY 90 tablet 3     Vitamin D, Cholecalciferol, 50 MCG (2000 UT) CAPS Take 2,000 Units by mouth daily          No Known Allergies     Social History     Tobacco Use     Smoking status: Never Smoker     Smokeless tobacco: Never Used   Substance Use Topics     Alcohol use: Not Currently       History   Drug Use Unknown         Objective     /70   Pulse 64   Temp 97.7  F (36.5  C)   Resp 16   Wt 76.7 kg (169 lb)   SpO2 100%   BMI 27.28 kg/m      Physical Exam  Constitutional:       General: She is not in acute distress.     Appearance: She is well-developed. She is not diaphoretic.   HENT:      Head: Normocephalic.      Right Ear: External ear  normal.      Left Ear: External ear normal.      Nose: Nose normal.   Eyes:      Conjunctiva/sclera: Conjunctivae normal.   Neck:      Musculoskeletal: Normal range of motion.   Cardiovascular:      Rate and Rhythm: Normal rate and regular rhythm.      Heart sounds: Normal heart sounds.   Pulmonary:      Effort: Pulmonary effort is normal.      Breath sounds: Normal breath sounds.   Skin:     General: Skin is warm and dry.   Neurological:      Mental Status: She is alert and oriented to person, place, and time.   Psychiatric:         Judgment: Judgment normal.           Recent Labs   Lab Test 03/20/21  0929 01/28/21  1638   HGB 14.4 12.4   PLT  --  381   NA  --  139   POTASSIUM  --  3.8   CR  --  0.93        Diagnostics:  No labs were ordered during this visit.   No EKG required for low risk surgery (cataract, skin procedure, breast biopsy, etc).    Revised Cardiac Risk Index (RCRI):  The patient has the following serious cardiovascular risks for perioperative complications:   - No serious cardiac risks = 0 points     RCRI Interpretation: 0 points: Class I (very low risk - 0.4% complication rate)           Signed Electronically by: Vesta Spain PA-C  Copy of this evaluation report is provided to requesting physician.

## 2021-06-23 ASSESSMENT — ANXIETY QUESTIONNAIRES: GAD7 TOTAL SCORE: 0

## 2021-07-02 DIAGNOSIS — Z11.59 ENCOUNTER FOR SCREENING FOR OTHER VIRAL DISEASES: ICD-10-CM

## 2021-07-02 LAB
SARS-COV-2 RNA RESP QL NAA+PROBE: NORMAL
SPECIMEN SOURCE: NORMAL

## 2021-07-02 PROCEDURE — U0003 INFECTIOUS AGENT DETECTION BY NUCLEIC ACID (DNA OR RNA); SEVERE ACUTE RESPIRATORY SYNDROME CORONAVIRUS 2 (SARS-COV-2) (CORONAVIRUS DISEASE [COVID-19]), AMPLIFIED PROBE TECHNIQUE, MAKING USE OF HIGH THROUGHPUT TECHNOLOGIES AS DESCRIBED BY CMS-2020-01-R: HCPCS | Performed by: OPHTHALMOLOGY

## 2021-07-02 PROCEDURE — U0005 INFEC AGEN DETEC AMPLI PROBE: HCPCS | Performed by: OPHTHALMOLOGY

## 2021-07-03 LAB
LABORATORY COMMENT REPORT: NORMAL
SARS-COV-2 RNA RESP QL NAA+PROBE: NEGATIVE
SPECIMEN SOURCE: NORMAL

## 2021-07-06 ENCOUNTER — ANESTHESIA (OUTPATIENT)
Dept: SURGERY | Facility: CLINIC | Age: 61
End: 2021-07-06
Payer: COMMERCIAL

## 2021-07-06 ENCOUNTER — ANESTHESIA EVENT (OUTPATIENT)
Dept: SURGERY | Facility: CLINIC | Age: 61
End: 2021-07-06
Payer: COMMERCIAL

## 2021-07-06 ENCOUNTER — HOSPITAL ENCOUNTER (OUTPATIENT)
Facility: CLINIC | Age: 61
Discharge: HOME | End: 2021-07-06
Attending: OPHTHALMOLOGY | Admitting: OPHTHALMOLOGY
Payer: COMMERCIAL

## 2021-07-06 VITALS
OXYGEN SATURATION: 98 % | TEMPERATURE: 97.4 F | BODY MASS INDEX: 26.76 KG/M2 | HEIGHT: 67 IN | WEIGHT: 170.5 LBS | DIASTOLIC BLOOD PRESSURE: 67 MMHG | HEART RATE: 64 BPM | SYSTOLIC BLOOD PRESSURE: 120 MMHG | RESPIRATION RATE: 14 BRPM

## 2021-07-06 DIAGNOSIS — H02.831 DERMATOCHALASIS OF BOTH UPPER EYELIDS: Primary | ICD-10-CM

## 2021-07-06 DIAGNOSIS — H02.834 DERMATOCHALASIS OF BOTH UPPER EYELIDS: Primary | ICD-10-CM

## 2021-07-06 PROCEDURE — 272N000001 HC OR GENERAL SUPPLY STERILE: Performed by: OPHTHALMOLOGY

## 2021-07-06 PROCEDURE — 999N000141 HC STATISTIC PRE-PROCEDURE NURSING ASSESSMENT: Performed by: OPHTHALMOLOGY

## 2021-07-06 PROCEDURE — 258N000003 HC RX IP 258 OP 636: Performed by: NURSE ANESTHETIST, CERTIFIED REGISTERED

## 2021-07-06 PROCEDURE — 710N000009 HC RECOVERY PHASE 1, LEVEL 1, PER MIN: Performed by: OPHTHALMOLOGY

## 2021-07-06 PROCEDURE — 250N000011 HC RX IP 250 OP 636: Performed by: NURSE ANESTHETIST, CERTIFIED REGISTERED

## 2021-07-06 PROCEDURE — 250N000009 HC RX 250: Performed by: OPHTHALMOLOGY

## 2021-07-06 PROCEDURE — 360N000076 HC SURGERY LEVEL 3, PER MIN: Performed by: OPHTHALMOLOGY

## 2021-07-06 PROCEDURE — 710N000012 HC RECOVERY PHASE 2, PER MINUTE: Performed by: OPHTHALMOLOGY

## 2021-07-06 PROCEDURE — 370N000017 HC ANESTHESIA TECHNICAL FEE, PER MIN: Performed by: OPHTHALMOLOGY

## 2021-07-06 PROCEDURE — 250N000009 HC RX 250: Performed by: NURSE ANESTHETIST, CERTIFIED REGISTERED

## 2021-07-06 RX ORDER — HYDROMORPHONE HYDROCHLORIDE 1 MG/ML
.3-.5 INJECTION, SOLUTION INTRAMUSCULAR; INTRAVENOUS; SUBCUTANEOUS EVERY 10 MIN PRN
Status: DISCONTINUED | OUTPATIENT
Start: 2021-07-06 | End: 2021-07-06 | Stop reason: HOSPADM

## 2021-07-06 RX ORDER — TETRACAINE HYDROCHLORIDE 5 MG/ML
SOLUTION OPHTHALMIC PRN
Status: DISCONTINUED | OUTPATIENT
Start: 2021-07-06 | End: 2021-07-06 | Stop reason: HOSPADM

## 2021-07-06 RX ORDER — FENTANYL CITRATE 50 UG/ML
25-50 INJECTION, SOLUTION INTRAMUSCULAR; INTRAVENOUS
Status: DISCONTINUED | OUTPATIENT
Start: 2021-07-06 | End: 2021-07-06 | Stop reason: HOSPADM

## 2021-07-06 RX ORDER — ONDANSETRON 2 MG/ML
4 INJECTION INTRAMUSCULAR; INTRAVENOUS EVERY 30 MIN PRN
Status: DISCONTINUED | OUTPATIENT
Start: 2021-07-06 | End: 2021-07-06 | Stop reason: HOSPADM

## 2021-07-06 RX ORDER — SODIUM CHLORIDE, SODIUM LACTATE, POTASSIUM CHLORIDE, CALCIUM CHLORIDE 600; 310; 30; 20 MG/100ML; MG/100ML; MG/100ML; MG/100ML
INJECTION, SOLUTION INTRAVENOUS CONTINUOUS
Status: DISCONTINUED | OUTPATIENT
Start: 2021-07-06 | End: 2021-07-06 | Stop reason: HOSPADM

## 2021-07-06 RX ORDER — NALOXONE HYDROCHLORIDE 0.4 MG/ML
0.2 INJECTION, SOLUTION INTRAMUSCULAR; INTRAVENOUS; SUBCUTANEOUS
Status: DISCONTINUED | OUTPATIENT
Start: 2021-07-06 | End: 2021-07-06 | Stop reason: HOSPADM

## 2021-07-06 RX ORDER — ONDANSETRON 4 MG/1
4 TABLET, ORALLY DISINTEGRATING ORAL EVERY 30 MIN PRN
Status: DISCONTINUED | OUTPATIENT
Start: 2021-07-06 | End: 2021-07-06 | Stop reason: HOSPADM

## 2021-07-06 RX ORDER — PROPOFOL 10 MG/ML
INJECTION, EMULSION INTRAVENOUS PRN
Status: DISCONTINUED | OUTPATIENT
Start: 2021-07-06 | End: 2021-07-06

## 2021-07-06 RX ORDER — LIDOCAINE HYDROCHLORIDE 20 MG/ML
INJECTION, SOLUTION INFILTRATION; PERINEURAL PRN
Status: DISCONTINUED | OUTPATIENT
Start: 2021-07-06 | End: 2021-07-06

## 2021-07-06 RX ORDER — ERYTHROMYCIN 5 MG/G
OINTMENT OPHTHALMIC PRN
Status: DISCONTINUED | OUTPATIENT
Start: 2021-07-06 | End: 2021-07-06 | Stop reason: HOSPADM

## 2021-07-06 RX ORDER — HYDROCODONE BITARTRATE AND ACETAMINOPHEN 5; 325 MG/1; MG/1
1 TABLET ORAL EVERY 6 HOURS PRN
Qty: 10 TABLET | Refills: 0 | Status: SHIPPED | OUTPATIENT
Start: 2021-07-06 | End: 2021-07-09

## 2021-07-06 RX ORDER — SODIUM CHLORIDE, SODIUM LACTATE, POTASSIUM CHLORIDE, CALCIUM CHLORIDE 600; 310; 30; 20 MG/100ML; MG/100ML; MG/100ML; MG/100ML
INJECTION, SOLUTION INTRAVENOUS CONTINUOUS PRN
Status: DISCONTINUED | OUTPATIENT
Start: 2021-07-06 | End: 2021-07-06

## 2021-07-06 RX ORDER — MEPERIDINE HYDROCHLORIDE 25 MG/ML
12.5 INJECTION INTRAMUSCULAR; INTRAVENOUS; SUBCUTANEOUS
Status: DISCONTINUED | OUTPATIENT
Start: 2021-07-06 | End: 2021-07-06 | Stop reason: HOSPADM

## 2021-07-06 RX ORDER — FENTANYL CITRATE 50 UG/ML
INJECTION, SOLUTION INTRAMUSCULAR; INTRAVENOUS PRN
Status: DISCONTINUED | OUTPATIENT
Start: 2021-07-06 | End: 2021-07-06

## 2021-07-06 RX ORDER — ONDANSETRON 2 MG/ML
INJECTION INTRAMUSCULAR; INTRAVENOUS PRN
Status: DISCONTINUED | OUTPATIENT
Start: 2021-07-06 | End: 2021-07-06

## 2021-07-06 RX ORDER — ERYTHROMYCIN 5 MG/G
OINTMENT OPHTHALMIC 3 TIMES DAILY
Qty: 7 G | Refills: 1 | Status: SHIPPED | OUTPATIENT
Start: 2021-07-06 | End: 2022-03-28

## 2021-07-06 RX ORDER — NALOXONE HYDROCHLORIDE 0.4 MG/ML
0.4 INJECTION, SOLUTION INTRAMUSCULAR; INTRAVENOUS; SUBCUTANEOUS
Status: DISCONTINUED | OUTPATIENT
Start: 2021-07-06 | End: 2021-07-06 | Stop reason: HOSPADM

## 2021-07-06 RX ADMIN — FENTANYL CITRATE 25 MCG: 50 INJECTION, SOLUTION INTRAMUSCULAR; INTRAVENOUS at 09:35

## 2021-07-06 RX ADMIN — MIDAZOLAM 2 MG: 1 INJECTION INTRAMUSCULAR; INTRAVENOUS at 09:29

## 2021-07-06 RX ADMIN — ONDANSETRON 4 MG: 2 INJECTION INTRAMUSCULAR; INTRAVENOUS at 09:35

## 2021-07-06 RX ADMIN — PROPOFOL 50 MG: 10 INJECTION, EMULSION INTRAVENOUS at 09:35

## 2021-07-06 RX ADMIN — SODIUM CHLORIDE, POTASSIUM CHLORIDE, SODIUM LACTATE AND CALCIUM CHLORIDE: 600; 310; 30; 20 INJECTION, SOLUTION INTRAVENOUS at 09:29

## 2021-07-06 RX ADMIN — LIDOCAINE HYDROCHLORIDE 40 MG: 20 INJECTION, SOLUTION INFILTRATION; PERINEURAL at 09:35

## 2021-07-06 ASSESSMENT — MIFFLIN-ST. JEOR: SCORE: 1381.01

## 2021-07-06 NOTE — ANESTHESIA CARE TRANSFER NOTE
Patient: Mariama CONTEH Liddle    Procedure(s):  BILATERAL UPPER LID BLEPHAROPLASTY    Diagnosis: Dermatochalasis of right upper eyelid [H02.831]  Dermatochalasis of left upper eyelid [H02.834]  Diagnosis Additional Information: No value filed.    Anesthesia Type:   MAC     Note:    Oropharynx: oropharynx clear of all foreign objects  Level of Consciousness: awake  Oxygen Supplementation: room air    Independent Airway: airway patency satisfactory and stable  Dentition: dentition unchanged  Vital Signs Stable: post-procedure vital signs reviewed and stable  Report to RN Given: handoff report given  Patient transferred to: PACU    Handoff Report: Identifed the Patient, Identified the Reponsible Provider, Reviewed the pertinent medical history, Discussed the surgical course, Reviewed Intra-OP anesthesia mangement and issues during anesthesia, Set expectations for post-procedure period and Allowed opportunity for questions and acknowledgement of understanding      Vitals: (Last set prior to Anesthesia Care Transfer)  CRNA VITALS  7/6/2021 0941 - 7/6/2021 1016      7/6/2021             Resp Rate (set):  10        Electronically Signed By: BALA Mercer CRNA  July 6, 2021  10:16 AM

## 2021-07-06 NOTE — DISCHARGE INSTRUCTIONS
Same Day Surgery Discharge Instructions for  Sedation and General Anesthesia       It's not unusual to feel dizzy, light-headed or faint for up to 24 hours after surgery or while taking pain medication.  If you have these symptoms: sit for a few minutes before standing and have someone assist you when you get up to walk or use the bathroom.      You should rest and relax for the next 24 hours. We recommend you make arrangements to have an adult stay with you for at least 24 hours after your discharge.  Avoid hazardous and strenuous activity.      DO NOT DRIVE any vehicle or operate mechanical equipment for 24 hours following the end of your surgery.  Even though you may feel normal, your reactions may be affected by the medication you have received.      Do not drink alcoholic beverages for 24 hours following surgery.       Slowly progress to your regular diet as you feel able. It's not unusual to feel nauseated and/or vomit after receiving anesthesia.  If you develop these symptoms, drink clear liquids (apple juice, ginger ale, broth, 7-up, etc. ) until you feel better.  If your nausea and vomiting persists for 24 hours, please notify your surgeon.        All narcotic pain medications, along with inactivity and anesthesia, can cause constipation. Drinking plenty of liquids and increasing fiber intake will help.      For any questions of a medical nature, call your surgeon.      Do not make important decisions for 24 hours.      If you had general anesthesia, you may have a sore throat for a couple of days related to the breathing tube used during surgery.  You may use Cepacol lozenges to help with this discomfort.  If it worsens or if you develop a fever, contact your surgeon.       If you feel your pain is not well managed with the pain medications prescribed by your surgeon, please contact your surgeon's office to let them know so they can address your concerns.       CoVid 19 Information    We want to give you  information regarding Covid. Please consult your primary care provider with any questions you might have.     Patient who have symptoms (cough, fever, or shortness of breath), need to isolate for 7 days from when symptoms started OR 72 hours after fever resolves (without fever reducing medications) AND improvement of respiratory symptoms (whichever is longer).      Isolate yourself at home (in own room/own bathroom if possible)    Do Not allow any visitors    Do Not go to work or school    Do Not go to Protestant,  centers, shopping, or other public places.    Do Not shake hands.    Avoid close and intimate contact with others (hugging, kissing).    Follow CDC recommendations for household cleaning of frequently touched services.     After the initial 7 days, continue to isolate yourself from household members as much as possible. To continue decrease the risk of community spread and exposure, you and any members of your household should limit activities in public for 14 days after starting home isolation.     You can reference the following CDC link for helpful home isolation/care tips:  https://www.cdc.gov/coronavirus/2019-ncov/downloads/10Things.pdf    Protect Others:    Cover Your Mouth and Nose with a mask, disposable tissue or wash cloth to avoid spreading germs to others.    Wash your hands and face frequently with soap and water    Call Your Primary Doctor If: Breathing difficulty develops or you become worse.    For more information about COVID19 and options for caring for yourself at home, please visit the CDC website at https://www.cdc.gov/coronavirus/2019-ncov/about/steps-when-sick.html  For more options for care at [facility], please visit our website at [email or web address]            [facility]   Eyelid/Orbital Surgery Discharge Instructions  Dr. Tania Salguero     ICE COMPRESSES  Immediately following surgery, you should begin to apply ice  compresses.  Apply a cold gel pack or wrap a clean washcloth around a cup of crushed ice in a plastic bag (a bag of frozen peas also works well) and hold the cold compresses directly against the closed eyelid (s).Apply cold pack for a minimum of six times daily for no longer than 15 minutes at a time. Continue cold compresses every day until the bruising and swelling begin to subside.  This can vary for each patient, but three days may be common.    HOT COMPRESSES  After your swelling and bruising have begun to subside, hot compresses should be applied.  Take a clean washcloth and wring it out in hot water (as warm as you can tolerate comfortably).  Hold this warm compress against the closed eyelid(s) at least six times per day for 15 minutes.  This should be continued for about two weeks.    OINTMENT  You may be given some ointment when you leave the hospital.  Apply this ointment as directed per pharmacy label for 7 days.  Expect some blurring of vision from the ointment.     ACTIVITY  Avoid heavy lifting or vigorous exercise for one week after surgery.  You may resume regular activities as tolerated.  You may shower and wash your hair on the day after surgery; be careful to avoid soaking the wounds or getting shampoo in your eyes. While your eyes are still swollen, it is recommended you sleep on your back and elevate your head with 2-3 pillows.    MEDICATION  If the doctor has given you some medications to take after surgery, please take these according to the instructions on the bottle.  Pain medications may make you drowsy so do not drive, operate heavy machinery, or use alcohol while taking it.  When you feel that you do not need the prescription pain medication, you may substitute Extra Strength Tylenol for mild pain by also following the directions on the bottle.    If you were taking Aspirin prior to your surgery, you may resume this medication tomorrow.    If you were on an anticoagulant, you may resume  taking it with the next scheduled dose.      WHAT TO EXPECT  You should expect some slight oozing of blood from the incision site over the first two to three days after surgery.  Swelling and bruising will occur for one to two weeks or longer.  You may also experience itching and tearing during the first several weeks after surgery.  This is part of the normal healing process    QUESTIONS  Please feel free to contact the office, should you have any questions that are not answered above.  The phone number is [phone].  Please call immediately if you are unable to establish vision in the operative eye, you are experiencing heavy bleeding that will not stop with gentle pressure or you have any signs of an infection (greenish/yellow discharge or progressive redness).    [facility]  [address]  [place], [place] [id number] [phone]        **If you have questions or concerns about your procedure,   call Dr. Salguero at [phone]**

## 2021-07-06 NOTE — ANESTHESIA PREPROCEDURE EVALUATION
Anesthesia Pre-Procedure Evaluation    Patient: Brenda H Liddle   MRN: [id number] : 1960        Preoperative Diagnosis: Dermatochalasis of right upper eyelid [H02.831]  Dermatochalasis of left upper eyelid [H02.834]   Procedure : Procedure(s):  BILATERAL UPPER LID BLEPHAROPLASTY     Past Medical History:   Diagnosis Date     Depressive disorder      HLD (hyperlipidemia)      Insomnia       Past Surgical History:   Procedure Laterality Date     LAPAROSCOPIC APPENDECTOMY N/A 2021    Procedure: APPENDECTOMY, LAPAROSCOPIC;  Surgeon: Linden Mcdonald MD;  Location: [facility] OR     wisdom teeth removed        No Known Allergies   Social History     Tobacco Use     Smoking status: Never Smoker     Smokeless tobacco: Never Used   Substance Use Topics     Alcohol use: Not Currently      Wt Readings from Last 1 Encounters:   21 77.3 kg (170 lb 8 oz)        Anesthesia Evaluation   Pt has had prior anesthetic.         ROS/MED HX  ENT/Pulmonary:  - neg pulmonary ROS     Neurologic: Comment: H/o insomnia.      Cardiovascular:  - neg cardiovascular ROS     METS/Exercise Tolerance:     Hematologic:  - neg hematologic  ROS     Musculoskeletal:  - neg musculoskeletal ROS     GI/Hepatic:  - neg GI/hepatic ROS     Renal/Genitourinary:  - neg Renal ROS     Endo:  - neg endo ROS     Psychiatric/Substance Use: Comment: H/o mild depression      Infectious Disease:  - neg infectious disease ROS     Malignancy:       Other:            Physical Exam    Airway  airway exam normal           Respiratory Devices and Support         Dental  no notable dental history         Cardiovascular   cardiovascular exam normal          Pulmonary   pulmonary exam normal                OUTSIDE LABS:  CBC:   Lab Results   Component Value Date    WBC 6.9 2021    HGB 14.4 2021    HGB 12.4 2021    HCT 38.3 2021     2021     BMP:   Lab Results   Component Value Date     2021    POTASSIUM 3.8  01/28/2021    POTASSIUM 4.5 08/28/2017    CHLORIDE 105 01/28/2021    CO2 28 01/28/2021    BUN 16 01/28/2021    CR 0.93 01/28/2021    CR 0.99 08/28/2017    GLC 88 03/20/2021    GLC 88 01/28/2021     COAGS: No results found for: PTT, INR, FIBR  POC: No results found for: BGM, HCG, HCGS  HEPATIC:   Lab Results   Component Value Date    ALBUMIN 3.3 (L) 01/28/2021    PROTTOTAL 7.1 01/28/2021    ALT 28 03/20/2021    AST 26 01/28/2021    ALKPHOS 104 01/28/2021    BILITOTAL 0.2 01/28/2021     OTHER:   Lab Results   Component Value Date    JOE 9.3 01/28/2021    LIPASE 125 01/28/2021       Anesthesia Plan    ASA Status:  2      Anesthesia Type: MAC.     - Reason for MAC: straight local not clinically adequate              Consents    Anesthesia Plan(s) and associated risks, benefits, and realistic alternatives discussed. Questions answered and patient/representative(s) expressed understanding.     - Discussed with:  Patient         Postoperative Care    Pain management: IV analgesics, Multi-modal analgesia.   PONV prophylaxis: Ondansetron (or other 5HT-3)     Comments:                Kyle Teran MD

## 2021-07-06 NOTE — ANESTHESIA POSTPROCEDURE EVALUATION
Patient: Mariama CONTEH Liddle    Procedure(s):  BILATERAL UPPER LID BLEPHAROPLASTY    Diagnosis:Dermatochalasis of right upper eyelid [H02.831]  Dermatochalasis of left upper eyelid [H02.834]  Diagnosis Additional Information: No value filed.    Anesthesia Type:  MAC    Note:  Disposition: Outpatient   Postop Pain Control: Uneventful            Sign Out: Well controlled pain   PONV: No   Neuro/Psych: Uneventful            Sign Out: Acceptable/Baseline neuro status   Airway/Respiratory: Uneventful            Sign Out: Acceptable/Baseline resp. status   CV/Hemodynamics: Uneventful            Sign Out: Acceptable CV status; No obvious hypovolemia; No obvious fluid overload   Other NRE: NONE   DID A NON-ROUTINE EVENT OCCUR? No           Last vitals:  Vitals:    07/06/21 0759   BP: 126/64   Pulse: 63   Resp: 16   Temp: 36.8  C (98.2  F)   SpO2: 98%       Last vitals prior to Anesthesia Care Transfer:  CRNA VITALS  7/6/2021 0941 - 7/6/2021 1023      7/6/2021             EKG:  Sinus rhythm          Electronically Signed By: Kyle Teran MD  July 6, 2021  10:23 AM

## 2021-07-06 NOTE — OP NOTE
"Pre operative Diagnosis:  1.Bilateral upper eyelid dermatochalasis     Post-operative Diagnosis: Same as above      Procedure(s):    1. Bilateral upper eyelid blepharoplasty with medial fat excision and temporal chromic sutures     Surgeon: Tania Salguero MD      Anesthesia: Monitored anesthesia care with local anesthetic      Blood loss: <5 cc      Complications: None      Specimens: None     Operative Procedure:  The risks, benefits and alternatives to the procedure were discussed with the patient.The patient agreed to the planned procedure and the patient was brought to the operating room.  A \"time out\" was performed to ensure the correct surgical site was being operated on.  The eyelid skin was cleaned with isopropyl alcohol.  The upper eyelid blepharoplasty markings were also placed using the patient's natural creases with care taken to avoid any post operative lagophthalmos.  Local anesthetic was injected into the operative site(s). The patient was prepped and draped in the usual sterile fashion.      Attention was the directed to the upper eyelid blepharoplasty.  Attention was directed to the upper eyelids.  An incision was made with the #15 blade through the skin. The tissue was removed in a preseptal plane.  Hemostasis was achieved with cautery. Medially, septum was opened, and the medial fat pad was conservatively excised.  Hemostasis was again confirmed.  Temporally, some orbicularis was removed to prevent post op brow ptosis. A chromic suture was placed temporally at the level of the peak of the brow in each side.  The upper eyelid incision was closed with 6-0 Prolene sutures in a running and interrupted fashion.  This same procedure was performed on the other side.     The patient's face was washed with wet and then dry 4x4s and ointment was placed over the upper eyelid incisions.  She was transferred to recovery in stable condition.     Tania Salguero MD  "

## 2021-09-19 ENCOUNTER — HEALTH MAINTENANCE LETTER (OUTPATIENT)
Age: 61
End: 2021-09-19

## 2021-10-19 PROBLEM — F32.9 MAJOR DEPRESSION: Status: ACTIVE | Noted: 2021-03-10

## 2021-10-21 ENCOUNTER — IMMUNIZATION (OUTPATIENT)
Dept: FAMILY MEDICINE | Facility: CLINIC | Age: 61
End: 2021-10-21
Payer: COMMERCIAL

## 2021-10-21 DIAGNOSIS — Z23 NEED FOR PROPHYLACTIC VACCINATION AND INOCULATION AGAINST INFLUENZA: Primary | ICD-10-CM

## 2021-10-21 PROCEDURE — 99207 PR NO CHARGE NURSE ONLY: CPT

## 2021-10-21 PROCEDURE — 90682 RIV4 VACC RECOMBINANT DNA IM: CPT

## 2021-10-21 PROCEDURE — 90471 IMMUNIZATION ADMIN: CPT

## 2021-11-24 ENCOUNTER — HOSPITAL ENCOUNTER (OUTPATIENT)
Dept: MAMMOGRAPHY | Facility: CLINIC | Age: 61
Discharge: HOME | End: 2021-11-24
Attending: PHYSICIAN ASSISTANT | Admitting: PHYSICIAN ASSISTANT
Payer: COMMERCIAL

## 2021-11-24 DIAGNOSIS — Z12.31 VISIT FOR SCREENING MAMMOGRAM: ICD-10-CM

## 2021-11-24 PROCEDURE — 77063 BREAST TOMOSYNTHESIS BI: CPT

## 2022-03-21 DIAGNOSIS — E78.5 HYPERLIPIDEMIA LDL GOAL <160: ICD-10-CM

## 2022-03-23 RX ORDER — ROSUVASTATIN CALCIUM 5 MG/1
5 TABLET, COATED ORAL DAILY
Qty: 90 TABLET | Refills: 0 | Status: SHIPPED | OUTPATIENT
Start: 2022-03-23 | End: 2022-04-29

## 2022-03-23 NOTE — TELEPHONE ENCOUNTER
Ok for 90 day kevin refill.   Patient is due for preventive care and labs.     Panchito Spain PA-C

## 2022-03-23 NOTE — TELEPHONE ENCOUNTER
Routing refill request to provider for review/approval because:  Labs out of range: LDL  Labs not current:  LDL    Krystle HERRERA RN  EP Triage

## 2022-03-25 ENCOUNTER — TELEPHONE (OUTPATIENT)
Dept: FAMILY MEDICINE | Facility: CLINIC | Age: 62
End: 2022-03-25
Payer: COMMERCIAL

## 2022-03-25 DIAGNOSIS — Z29.9 ENCOUNTER FOR PREVENTIVE MEASURE: Primary | ICD-10-CM

## 2022-03-25 NOTE — TELEPHONE ENCOUNTER
Patient regarding 4/25/2022 appointment.  She is requesting to do labs prior to the appointment, has scheduled lab appointment on 4/23/2022.  Advised patient we would check to see if PCP wants labs done prior to appointment.  If so, please order future labs.    Ok to send patient a my chart message if labs are not prior to appointment so LAB ONLY visit on 4/23 can be cancelled.    Mariela Yanez RN

## 2022-04-20 DIAGNOSIS — F41.1 GAD (GENERALIZED ANXIETY DISORDER): ICD-10-CM

## 2022-04-23 ENCOUNTER — LAB (OUTPATIENT)
Dept: LAB | Facility: CLINIC | Age: 62
End: 2022-04-23
Payer: COMMERCIAL

## 2022-04-23 DIAGNOSIS — Z29.9 ENCOUNTER FOR PREVENTIVE MEASURE: ICD-10-CM

## 2022-04-23 LAB
ALT SERPL W P-5'-P-CCNC: 31 U/L (ref 0–50)
CHOLEST SERPL-MCNC: 151 MG/DL
FASTING STATUS PATIENT QL REPORTED: YES
FASTING STATUS PATIENT QL REPORTED: YES
GLUCOSE BLD-MCNC: 96 MG/DL (ref 70–99)
HDLC SERPL-MCNC: 51 MG/DL
LDLC SERPL CALC-MCNC: 81 MG/DL
NONHDLC SERPL-MCNC: 100 MG/DL
TRIGL SERPL-MCNC: 94 MG/DL

## 2022-04-23 PROCEDURE — 36415 COLL VENOUS BLD VENIPUNCTURE: CPT

## 2022-04-23 PROCEDURE — 80061 LIPID PANEL: CPT

## 2022-04-23 PROCEDURE — 82947 ASSAY GLUCOSE BLOOD QUANT: CPT

## 2022-04-23 PROCEDURE — 84460 ALANINE AMINO (ALT) (SGPT): CPT

## 2022-04-24 NOTE — RESULT ENCOUNTER NOTE
Mariama    Your lab tests are complete and I have reviewed the results.     - Your lab results look great; everything is normal.    If you have any questions or concerns, please feel free to call or send a [employer] message.    Sincerely,  Panchito Spain PA-C

## 2022-04-28 ASSESSMENT — ENCOUNTER SYMPTOMS
WEAKNESS: 0
FREQUENCY: 0
JOINT SWELLING: 0
MYALGIAS: 0
HEMATURIA: 0
PARESTHESIAS: 0
HEARTBURN: 0
HEMATOCHEZIA: 0
EYE PAIN: 0
NAUSEA: 0
PALPITATIONS: 0
SHORTNESS OF BREATH: 0
COUGH: 0
ABDOMINAL PAIN: 0
DYSURIA: 0
HEADACHES: 0
BREAST MASS: 0
DIZZINESS: 0
CONSTIPATION: 0
DIARRHEA: 0
SORE THROAT: 0
CHILLS: 0
NERVOUS/ANXIOUS: 0
FEVER: 0
ARTHRALGIAS: 0

## 2022-04-29 ENCOUNTER — OFFICE VISIT (OUTPATIENT)
Dept: FAMILY MEDICINE | Facility: CLINIC | Age: 62
End: 2022-04-29
Payer: COMMERCIAL

## 2022-04-29 VITALS
DIASTOLIC BLOOD PRESSURE: 62 MMHG | WEIGHT: 170.4 LBS | BODY MASS INDEX: 27.38 KG/M2 | RESPIRATION RATE: 16 BRPM | HEART RATE: 63 BPM | SYSTOLIC BLOOD PRESSURE: 110 MMHG | HEIGHT: 66 IN | TEMPERATURE: 97 F | OXYGEN SATURATION: 95 %

## 2022-04-29 DIAGNOSIS — E78.5 HYPERLIPIDEMIA LDL GOAL <160: ICD-10-CM

## 2022-04-29 DIAGNOSIS — Z00.00 ENCOUNTER FOR ROUTINE ADULT HEALTH EXAMINATION WITHOUT ABNORMAL FINDINGS: Primary | ICD-10-CM

## 2022-04-29 DIAGNOSIS — F33.42 RECURRENT MAJOR DEPRESSIVE DISORDER, IN FULL REMISSION (H): ICD-10-CM

## 2022-04-29 PROCEDURE — 99214 OFFICE O/P EST MOD 30 MIN: CPT | Mod: 25 | Performed by: PHYSICIAN ASSISTANT

## 2022-04-29 PROCEDURE — 99396 PREV VISIT EST AGE 40-64: CPT | Performed by: PHYSICIAN ASSISTANT

## 2022-04-29 PROCEDURE — 96127 BRIEF EMOTIONAL/BEHAV ASSMT: CPT | Performed by: PHYSICIAN ASSISTANT

## 2022-04-29 RX ORDER — ROSUVASTATIN CALCIUM 5 MG/1
5 TABLET, COATED ORAL DAILY
Qty: 90 TABLET | Refills: 3 | Status: SHIPPED | OUTPATIENT
Start: 2022-04-29 | End: 2023-06-22

## 2022-04-29 ASSESSMENT — ENCOUNTER SYMPTOMS
MYALGIAS: 0
HEARTBURN: 0
ARTHRALGIAS: 0
COUGH: 0
DYSURIA: 0
BREAST MASS: 0
FREQUENCY: 0
HEMATURIA: 0
JOINT SWELLING: 0
HEMATOCHEZIA: 0
DIARRHEA: 0
DIZZINESS: 0
ABDOMINAL PAIN: 0
EYE PAIN: 0
SHORTNESS OF BREATH: 0
PARESTHESIAS: 0
CONSTIPATION: 0
NERVOUS/ANXIOUS: 0
HEADACHES: 0
FEVER: 0
SORE THROAT: 0
CHILLS: 0
PALPITATIONS: 0
NAUSEA: 0
WEAKNESS: 0

## 2022-04-29 ASSESSMENT — PATIENT HEALTH QUESTIONNAIRE - PHQ9: SUM OF ALL RESPONSES TO PHQ QUESTIONS 1-9: 0

## 2022-04-29 ASSESSMENT — PAIN SCALES - GENERAL: PAINLEVEL: NO PAIN (0)

## 2022-04-29 NOTE — PROGRESS NOTES
SUBJECTIVE:   CC: Brenda H Liddle is an 61 year old woman who presents for preventive health visit.       Patient has been advised of split billing requirements and indicates understanding: Yes  Healthy Habits:     Getting at least 3 servings of Calcium per day:  Yes    Bi-annual eye exam:  Yes    Dental care twice a year:  Yes    Sleep apnea or symptoms of sleep apnea:  None    Diet:  Regular (no restrictions)    Frequency of exercise:  None    Taking medications regularly:  Yes    Medication side effects:  None    PHQ-2 Total Score: 0    Additional concerns today:  No      Today's PHQ-2 Score:   PHQ-2 ( 1999 [employer]) 4/28/2022   Q1: Little interest or pleasure in doing things 0   Q2: Feeling down, depressed or hopeless 0   PHQ-2 Score 0   PHQ-2 Total Score (12-17 Years)- Positive if 3 or more points; Administer PHQ-A if positive -   Q1: Little interest or pleasure in doing things Not at all   Q2: Feeling down, depressed or hopeless Not at all   PHQ-2 Score 0       PHQ 6/22/2021 4/29/2022   PHQ-9 Total Score 0 0   Q9: Thoughts of better off dead/self-harm past 2 weeks Not at all Not at all         Social History     Tobacco Use     Smoking status: Never Smoker     Smokeless tobacco: Never Used   Substance Use Topics     Alcohol use: Not Currently       Alcohol Use 4/28/2022   Prescreen: >3 drinks/day or >7 drinks/week? Not Applicable     Reviewed orders with patient.  Reviewed health maintenance and updated orders accordingly - Yes  Labs reviewed in EPIC    Breast Cancer Screening: Mammo due in Nov.     Breast CA Risk Assessment (FHS-7) 4/28/2022   Do you have a family history of breast, colon, or ovarian cancer? No / Unknown           Pertinent mammograms are reviewed under the imaging tab.    History of abnormal Pap smear: NO - age 30-65 PAP every 5 years with negative HPV co-testing recommended  PAP / HPV Latest Ref Rng & Units 3/10/2021   PAP (Historical) - NIL   HPV16 NEG:Negative Negative   HPV18  "NEG:Negative Negative   HRHPV NEG:Negative Negative     Reviewed and updated as needed this visit by clinical staff   Tobacco  Allergies    Med Hx              Reviewed and updated as needed this visit by Provider                       Review of Systems   Constitutional: Negative for chills and fever.   HENT: Negative for congestion, ear pain, hearing loss and sore throat.    Eyes: Negative for pain and visual disturbance.   Respiratory: Negative for cough and shortness of breath.    Cardiovascular: Negative for chest pain, palpitations and peripheral edema.   Gastrointestinal: Negative for abdominal pain, constipation, diarrhea, heartburn, hematochezia and nausea.   Breasts:  Negative for tenderness, breast mass and discharge.   Genitourinary: Negative for dysuria, frequency, genital sores, hematuria, pelvic pain, urgency, vaginal bleeding and vaginal discharge.   Musculoskeletal: Negative for arthralgias, joint swelling and myalgias.   Skin: Negative for rash.   Neurological: Negative for dizziness, weakness, headaches and paresthesias.   Psychiatric/Behavioral: Negative for mood changes. The patient is not nervous/anxious.           OBJECTIVE:   /62   Pulse 63   Temp 97  F (36.1  C) (Tympanic)   Resp 16   Ht 1.676 m (5' 6\")   Wt 77.3 kg (170 lb 6.4 oz)   SpO2 95%   BMI 27.50 kg/m    Physical Exam  GENERAL APPEARANCE: healthy, alert and no distress  EYES: Eyes grossly normal to inspection, PERRL and conjunctivae and sclerae normal  HENT: ear canals and TM's normal, nose and mouth without ulcers or lesions, oropharynx clear and oral mucous membranes moist  NECK: no adenopathy, no asymmetry, masses, or scars and thyroid normal to palpation  RESP: lungs clear to auscultation - no rales, rhonchi or wheezes  BREAST: normal without masses, tenderness or nipple discharge and no palpable axillary masses or adenopathy  CV: regular rate and rhythm, normal S1 S2, no S3 or S4, no murmur, click or rub, no " "peripheral edema and peripheral pulses strong  ABDOMEN: soft, nontender, no hepatosplenomegaly, no masses and bowel sounds normal  MS: no musculoskeletal defects are noted and gait is age appropriate without ataxia  SKIN: no suspicious lesions or rashes  NEURO: Normal strength and tone, sensory exam grossly normal, mentation intact and speech normal  PSYCH: mentation appears normal and affect normal/bright    Diagnostic Test Results:  Labs reviewed in Epic    ASSESSMENT/PLAN:       ICD-10-CM    1. Encounter for routine adult health examination without abnormal findings  Z00.00    2. Hyperlipidemia LDL goal <160  E78.5 rosuvastatin (CRESTOR) 5 MG tablet   3. Recurrent major depressive disorder, in full remission (H)  F33.42       Preventive care as above  Refill rosuvastatin  MDD stable.  PHQ-9 of 0 today           COUNSELING:  Reviewed preventive health counseling, as reflected in patient instructions    Estimated body mass index is 27.5 kg/m  as calculated from the following:    Height as of this encounter: 1.676 m (5' 6\").    Weight as of this encounter: 77.3 kg (170 lb 6.4 oz).        She reports that she has never smoked. She has never used smokeless tobacco.      Counseling Resources:  ATP IV Guidelines  Pooled Cohorts Equation Calculator  Breast Cancer Risk Calculator  BRCA-Related Cancer Risk Assessment: FHS-7 Tool  FRAX Risk Assessment  ICSI Preventive Guidelines  Dietary Guidelines for Americans, 2010  USDA's MyPlate  ASA Prophylaxis  Lung CA Screening    [facility]  "

## 2022-04-30 ENCOUNTER — HEALTH MAINTENANCE LETTER (OUTPATIENT)
Age: 62
End: 2022-04-30

## 2022-11-20 ENCOUNTER — HEALTH MAINTENANCE LETTER (OUTPATIENT)
Age: 62
End: 2022-11-20

## 2022-12-05 ENCOUNTER — HOSPITAL ENCOUNTER (OUTPATIENT)
Dept: MAMMOGRAPHY | Facility: CLINIC | Age: 62
Discharge: HOME | End: 2022-12-05
Attending: PHYSICIAN ASSISTANT | Admitting: PHYSICIAN ASSISTANT
Payer: COMMERCIAL

## 2022-12-05 DIAGNOSIS — Z12.31 VISIT FOR SCREENING MAMMOGRAM: ICD-10-CM

## 2022-12-05 PROCEDURE — 77067 SCR MAMMO BI INCL CAD: CPT

## 2023-04-20 ENCOUNTER — PATIENT OUTREACH (OUTPATIENT)
Dept: CARE COORDINATION | Facility: CLINIC | Age: 63
End: 2023-04-20
Payer: COMMERCIAL

## 2023-06-02 ENCOUNTER — HEALTH MAINTENANCE LETTER (OUTPATIENT)
Age: 63
End: 2023-06-02

## 2023-08-16 ENCOUNTER — OFFICE VISIT (OUTPATIENT)
Dept: FAMILY MEDICINE | Facility: CLINIC | Age: 63
End: 2023-08-16
Payer: COMMERCIAL

## 2023-08-16 VITALS
DIASTOLIC BLOOD PRESSURE: 77 MMHG | OXYGEN SATURATION: 97 % | SYSTOLIC BLOOD PRESSURE: 114 MMHG | HEART RATE: 66 BPM | BODY MASS INDEX: 28.83 KG/M2 | WEIGHT: 179.4 LBS | TEMPERATURE: 97.5 F | HEIGHT: 66 IN | RESPIRATION RATE: 15 BRPM

## 2023-08-16 DIAGNOSIS — K59.00 CONSTIPATION, UNSPECIFIED CONSTIPATION TYPE: ICD-10-CM

## 2023-08-16 DIAGNOSIS — E78.5 HYPERLIPIDEMIA LDL GOAL <160: ICD-10-CM

## 2023-08-16 DIAGNOSIS — Z00.00 ROUTINE HISTORY AND PHYSICAL EXAMINATION OF ADULT: Primary | ICD-10-CM

## 2023-08-16 DIAGNOSIS — E66.9 OBESITY, UNSPECIFIED CLASSIFICATION, UNSPECIFIED OBESITY TYPE, UNSPECIFIED WHETHER SERIOUS COMORBIDITY PRESENT: ICD-10-CM

## 2023-08-16 LAB
ALBUMIN SERPL BCG-MCNC: 4.6 G/DL (ref 3.5–5.2)
ALP SERPL-CCNC: 70 U/L (ref 35–104)
ALT SERPL W P-5'-P-CCNC: 27 U/L (ref 0–50)
ANION GAP SERPL CALCULATED.3IONS-SCNC: 13 MMOL/L (ref 7–15)
AST SERPL W P-5'-P-CCNC: 33 U/L (ref 0–45)
BASOPHILS # BLD AUTO: 0.1 10E3/UL (ref 0–0.2)
BASOPHILS NFR BLD AUTO: 1 %
BILIRUB SERPL-MCNC: 0.3 MG/DL
BUN SERPL-MCNC: 16.2 MG/DL (ref 8–23)
CALCIUM SERPL-MCNC: 10.4 MG/DL (ref 8.8–10.2)
CHLORIDE SERPL-SCNC: 105 MMOL/L (ref 98–107)
CHOLEST SERPL-MCNC: 187 MG/DL
CREAT SERPL-MCNC: 0.88 MG/DL (ref 0.51–0.95)
DEPRECATED HCO3 PLAS-SCNC: 24 MMOL/L (ref 22–29)
EOSINOPHIL # BLD AUTO: 0.4 10E3/UL (ref 0–0.7)
EOSINOPHIL NFR BLD AUTO: 4 %
ERYTHROCYTE [DISTWIDTH] IN BLOOD BY AUTOMATED COUNT: 13.3 % (ref 10–15)
GFR SERPL CREATININE-BSD FRML MDRD: 74 ML/MIN/1.73M2
GLUCOSE SERPL-MCNC: 87 MG/DL (ref 70–99)
HBA1C MFR BLD: 5.3 % (ref 0–5.6)
HCT VFR BLD AUTO: 43.8 % (ref 35–47)
HDLC SERPL-MCNC: 54 MG/DL
HGB BLD-MCNC: 14.3 G/DL (ref 11.7–15.7)
IMM GRANULOCYTES # BLD: 0 10E3/UL
IMM GRANULOCYTES NFR BLD: 0 %
LDLC SERPL CALC-MCNC: 106 MG/DL
LYMPHOCYTES # BLD AUTO: 1.6 10E3/UL (ref 0.8–5.3)
LYMPHOCYTES NFR BLD AUTO: 21 %
MCH RBC QN AUTO: 27.7 PG (ref 26.5–33)
MCHC RBC AUTO-ENTMCNC: 32.6 G/DL (ref 31.5–36.5)
MCV RBC AUTO: 85 FL (ref 78–100)
MONOCYTES # BLD AUTO: 0.7 10E3/UL (ref 0–1.3)
MONOCYTES NFR BLD AUTO: 9 %
NEUTROPHILS # BLD AUTO: 5.1 10E3/UL (ref 1.6–8.3)
NEUTROPHILS NFR BLD AUTO: 65 %
NONHDLC SERPL-MCNC: 133 MG/DL
PLATELET # BLD AUTO: 337 10E3/UL (ref 150–450)
POTASSIUM SERPL-SCNC: 4.4 MMOL/L (ref 3.4–5.3)
PROT SERPL-MCNC: 6.9 G/DL (ref 6.4–8.3)
RBC # BLD AUTO: 5.16 10E6/UL (ref 3.8–5.2)
SODIUM SERPL-SCNC: 142 MMOL/L (ref 136–145)
TRIGL SERPL-MCNC: 135 MG/DL
TSH SERPL DL<=0.005 MIU/L-ACNC: 1.56 UIU/ML (ref 0.3–4.2)
WBC # BLD AUTO: 7.9 10E3/UL (ref 4–11)

## 2023-08-16 PROCEDURE — 80061 LIPID PANEL: CPT | Performed by: INTERNAL MEDICINE

## 2023-08-16 PROCEDURE — 83036 HEMOGLOBIN GLYCOSYLATED A1C: CPT | Performed by: INTERNAL MEDICINE

## 2023-08-16 PROCEDURE — 85025 COMPLETE CBC W/AUTO DIFF WBC: CPT | Performed by: INTERNAL MEDICINE

## 2023-08-16 PROCEDURE — 84443 ASSAY THYROID STIM HORMONE: CPT | Performed by: INTERNAL MEDICINE

## 2023-08-16 PROCEDURE — 99214 OFFICE O/P EST MOD 30 MIN: CPT | Mod: 25 | Performed by: INTERNAL MEDICINE

## 2023-08-16 PROCEDURE — 99396 PREV VISIT EST AGE 40-64: CPT | Performed by: INTERNAL MEDICINE

## 2023-08-16 PROCEDURE — 36415 COLL VENOUS BLD VENIPUNCTURE: CPT | Performed by: INTERNAL MEDICINE

## 2023-08-16 PROCEDURE — 80053 COMPREHEN METABOLIC PANEL: CPT | Performed by: INTERNAL MEDICINE

## 2023-08-16 RX ORDER — ROSUVASTATIN CALCIUM 5 MG/1
5 TABLET, COATED ORAL DAILY
Qty: 90 TABLET | Refills: 3 | Status: SHIPPED | OUTPATIENT
Start: 2023-08-16

## 2023-08-16 ASSESSMENT — ENCOUNTER SYMPTOMS
FEVER: 0
FREQUENCY: 0
HEMATURIA: 0
NERVOUS/ANXIOUS: 0
DYSURIA: 0
EYE PAIN: 0
CONSTIPATION: 1
DIARRHEA: 0
HEADACHES: 0
PARESTHESIAS: 0
HEMATOCHEZIA: 0
DIZZINESS: 0
ABDOMINAL PAIN: 0
SHORTNESS OF BREATH: 0
COUGH: 0
MYALGIAS: 0
PALPITATIONS: 0
CHILLS: 0
NAUSEA: 0
WEAKNESS: 0
BREAST MASS: 0
JOINT SWELLING: 0
HEARTBURN: 0
ARTHRALGIAS: 0
SORE THROAT: 0

## 2023-08-16 ASSESSMENT — PAIN SCALES - GENERAL: PAINLEVEL: NO PAIN (0)

## 2023-08-16 NOTE — PROGRESS NOTES
"   SUBJECTIVE:   CC: Mariama is an 62 year old who presents for preventive health visit.     Healthy Habits:     Getting at least 3 servings of Calcium per day:  Yes    Bi-annual eye exam:  Yes    Dental care twice a year:  Yes    Sleep apnea or symptoms of sleep apnea:  None    Diet:  Regular (no restrictions)    Frequency of exercise:  4-5 days/week    Duration of exercise:  15-30 minutes    Taking medications regularly:  Yes    Medication side effects:  Not applicable    Additional concerns today:  Yes      Social History     Tobacco Use    Smoking status: Never    Smokeless tobacco: Never   Substance Use Topics    Alcohol use: Not Currently             8/16/2023    12:53 PM   Alcohol Use   Prescreen: >3 drinks/day or >7 drinks/week? Not Applicable             4/28/2022     5:56 PM   Breast CA Risk Assessment (FHS-7)   Do you have a family history of breast, colon, or ovarian cancer? No / Unknown             Latest Ref Rng & Units 3/10/2021     3:25 PM 3/10/2021     3:23 PM   PAP / HPV   PAP (Historical)   NIL    HPV 16 DNA NEG^Negative Negative     HPV 18 DNA NEG^Negative Negative     Other HR HPV NEG^Negative Negative       Reviewed and updated as needed this visit by clinical staff   Tobacco  Allergies  Meds              Reviewed and updated as needed this visit by Provider                     10 point ROS of systems including Constitutional, Eyes, Respiratory, Cardiovascular, Gastroenterology, Genitourinary, Integumentary, Muscularskeletal, Psychiatric were all negative except for pertinent positives noted in my HPI.       OBJECTIVE:   /77 (BP Location: Right arm, Patient Position: Sitting, Cuff Size: Adult Large)   Pulse 66   Temp 97.5  F (36.4  C) (Temporal)   Resp 15   Ht 1.676 m (5' 6\")   Wt 81.4 kg (179 lb 6.4 oz)   SpO2 97%   BMI 28.96 kg/m    Physical Exam    GENERAL APPEARANCE: AAOx3, no distress. Well developed.    EYES:  Conjunctivae clear without exudates. Sclera non-icteric.    " HENT: NC/AT. External ear and ear canal nontender and nonedematous bilaterally. No exudates. B/L TM intact. Oral mucosa without ulcers or lesions. Pharynx w/o exudates, erythema, edema.    NECK: Supple without adenopathy, Trachea is midline. No masses palpated.    RESP: Lungs CTA bilaterally. No w/r/r. No distress     CV: RRR, S1/S2 present. No m/r/c.     ABDOMEN:  soft, nontender, no distention. No rebound or guarding.     EXT: No c/c/e in lower extremities b/l. No rashes or deformities noted.    MSK: ROM and strength intact in four extremities. No gross deformities noted.    SKIN: no suspicious lesions or rashes    NEURO: AAOx3, Motor function intact w/ 5/5 strength bilaterally to UE and LE. Speech is fluent and comprehension intact. No gait abnormalities noted.    PSYCH: appropriate mood and affect.       ASSESSMENT/PLAN:   Mariama was seen today for physical.    Diagnoses and all orders for this visit:    Routine history and physical examination of adult   Pap/cscope/mammo UTD    Hyperlipidemia LDL goal <160  controlled  -     rosuvastatin (CRESTOR) 5 MG tablet; Take 1 tablet (5 mg) by mouth daily  -     CBC with Platelets & Differential; Future  -     Comprehensive metabolic panel; Future  -     Hemoglobin A1c; Future  -     Lipid panel reflex to direct LDL Fasting; Future    Obesity, unspecified classification, unspecified obesity type, unspecified whether serious comorbidity present   States she has trouble with weight loss, check TSH. Continue healthy lifestyle measures  -     TSH with free T4 reflex; Future    Constipation, unspecified constipation type   Using OTC miralax. Negative for alarm signs (rectal bleeding, vomiting, unintended weight loss). Recommend add fiber supplement and if worsening or not improving, follow-up. Cscope is UTD            She reports that she has never smoked. She has never used smokeless tobacco.      Gail Yan DO  [facility]

## 2023-11-06 ENCOUNTER — PATIENT OUTREACH (OUTPATIENT)
Dept: CARE COORDINATION | Facility: CLINIC | Age: 63
End: 2023-11-06
Payer: COMMERCIAL

## 2023-12-04 ENCOUNTER — PATIENT OUTREACH (OUTPATIENT)
Dept: CARE COORDINATION | Facility: CLINIC | Age: 63
End: 2023-12-04
Payer: COMMERCIAL

## 2023-12-07 ENCOUNTER — OFFICE VISIT (OUTPATIENT)
Dept: FAMILY MEDICINE | Facility: CLINIC | Age: 63
End: 2023-12-07
Payer: COMMERCIAL

## 2023-12-07 VITALS
DIASTOLIC BLOOD PRESSURE: 70 MMHG | TEMPERATURE: 98.9 F | HEIGHT: 66 IN | HEART RATE: 75 BPM | RESPIRATION RATE: 16 BRPM | WEIGHT: 177.6 LBS | OXYGEN SATURATION: 96 % | BODY MASS INDEX: 28.54 KG/M2 | SYSTOLIC BLOOD PRESSURE: 112 MMHG

## 2023-12-07 DIAGNOSIS — E83.52 SERUM CALCIUM ELEVATED: ICD-10-CM

## 2023-12-07 DIAGNOSIS — Z76.89 ENCOUNTER TO ESTABLISH CARE: ICD-10-CM

## 2023-12-07 DIAGNOSIS — E78.5 HYPERLIPIDEMIA LDL GOAL <160: ICD-10-CM

## 2023-12-07 DIAGNOSIS — F33.42 RECURRENT MAJOR DEPRESSIVE DISORDER, IN FULL REMISSION (H): ICD-10-CM

## 2023-12-07 DIAGNOSIS — Z12.31 VISIT FOR SCREENING MAMMOGRAM: Primary | ICD-10-CM

## 2023-12-07 LAB
ANION GAP SERPL CALCULATED.3IONS-SCNC: 9 MMOL/L (ref 7–15)
BUN SERPL-MCNC: 22.7 MG/DL (ref 8–23)
CALCIUM SERPL-MCNC: 9.6 MG/DL (ref 8.8–10.2)
CHLORIDE SERPL-SCNC: 106 MMOL/L (ref 98–107)
CREAT SERPL-MCNC: 1.03 MG/DL (ref 0.51–0.95)
DEPRECATED HCO3 PLAS-SCNC: 26 MMOL/L (ref 22–29)
EGFRCR SERPLBLD CKD-EPI 2021: 61 ML/MIN/1.73M2
GLUCOSE SERPL-MCNC: 94 MG/DL (ref 70–99)
POTASSIUM SERPL-SCNC: 4 MMOL/L (ref 3.4–5.3)
SODIUM SERPL-SCNC: 141 MMOL/L (ref 135–145)

## 2023-12-07 PROCEDURE — 90471 IMMUNIZATION ADMIN: CPT | Performed by: PHYSICIAN ASSISTANT

## 2023-12-07 PROCEDURE — 80048 BASIC METABOLIC PNL TOTAL CA: CPT | Performed by: PHYSICIAN ASSISTANT

## 2023-12-07 PROCEDURE — 36415 COLL VENOUS BLD VENIPUNCTURE: CPT | Performed by: PHYSICIAN ASSISTANT

## 2023-12-07 PROCEDURE — 90686 IIV4 VACC NO PRSV 0.5 ML IM: CPT | Performed by: PHYSICIAN ASSISTANT

## 2023-12-07 PROCEDURE — 99214 OFFICE O/P EST MOD 30 MIN: CPT | Mod: 25 | Performed by: PHYSICIAN ASSISTANT

## 2023-12-07 ASSESSMENT — PAIN SCALES - GENERAL: PAINLEVEL: NO PAIN (0)

## 2023-12-07 NOTE — PROGRESS NOTES
"  1. Encounter to establish care    2. Serum calcium elevated  Will recheck today  - Basic metabolic panel  (Ca, Cl, CO2, Creat, Gluc, K, Na, BUN); Future    3. Recurrent major depressive disorder, in full remission (H24)  Stable, will discuss at future visit    4. Hyperlipidemia LDL goal <160  Stable, on crestor    5. Visit for screening mammogram  - MA SCREENING DIGITAL BILAT - Future  (s+30); Future      Subjective   Mariama is a 63 year old, presenting for the following health issues:  Follow Up        12/7/2023     3:21 PM   Additional Questions   Roomed by Vasiliy   Accompanied by N/A       History of Present Illness       Reason for visit:  My calcium level was high and I was told to return in 3 months.    She eats 2-3 servings of fruits and vegetables daily.She consumes 0 sweetened beverage(s) daily.She exercises with enough effort to increase her heart rate 9 or less minutes per day.  She exercises with enough effort to increase her heart rate 3 or less days per week.   She is taking medications regularly.     Mariama presents to the clinic or a recheck of her calcium level.  This was slightly high at her last visit with her previous PCP and she was advised to recheck in 3 months.         Review of Systems   Constitutional, HEENT, cardiovascular, pulmonary, GI, , musculoskeletal, neuro, skin, endocrine and psych systems are negative, except as otherwise noted.      Objective    /70   Pulse 75   Temp 98.9  F (37.2  C) (Tympanic)   Resp 16   Ht 1.68 m (5' 6.14\")   Wt 80.6 kg (177 lb 9.6 oz)   SpO2 96%   BMI 28.54 kg/m    Body mass index is 28.54 kg/m .  Physical Exam   GENERAL: healthy, alert and no distress  RESP: lungs clear to auscultation - no rales, rhonchi or wheezes  CV: regular rate and rhythm, normal S1 S2, no S3 or S4, no murmur, click or rub, no peripheral edema and peripheral pulses strong  PSYCH: mentation appears normal, affect normal/bright              "

## 2023-12-12 NOTE — RESULT ENCOUNTER NOTE
Mariama-  Here are your recent results.     Your calcium is normal.    Your creatinine ( kidney function) was slightly high indicating reduced kidney function.  This may be a lab error or a transient change.  We should recheck this in 6 months.  In the meantime, try and stay well hydrated and avoid medication that can damage the kidneys like NSAID medication    Vasiliy Estevez PA-C

## 2024-01-17 ENCOUNTER — HOSPITAL ENCOUNTER (OUTPATIENT)
Dept: MAMMOGRAPHY | Facility: CLINIC | Age: 64
Discharge: HOME | End: 2024-01-17
Attending: PHYSICIAN ASSISTANT | Admitting: PHYSICIAN ASSISTANT
Payer: COMMERCIAL

## 2024-01-17 DIAGNOSIS — Z12.31 VISIT FOR SCREENING MAMMOGRAM: ICD-10-CM

## 2024-01-17 PROCEDURE — 77063 BREAST TOMOSYNTHESIS BI: CPT

## 2024-07-17 ENCOUNTER — PATIENT OUTREACH (OUTPATIENT)
Dept: CARE COORDINATION | Facility: CLINIC | Age: 64
End: 2024-07-17
Payer: COMMERCIAL

## 2024-07-31 ENCOUNTER — PATIENT OUTREACH (OUTPATIENT)
Dept: CARE COORDINATION | Facility: CLINIC | Age: 64
End: 2024-07-31
Payer: COMMERCIAL

## 2024-10-05 ENCOUNTER — MYC MEDICAL ADVICE (OUTPATIENT)
Dept: FAMILY MEDICINE | Facility: CLINIC | Age: 64
End: 2024-10-05
Payer: COMMERCIAL

## 2024-10-05 DIAGNOSIS — E78.5 HYPERLIPIDEMIA LDL GOAL <160: ICD-10-CM

## 2024-10-05 NOTE — TELEPHONE ENCOUNTER
Fax received at [facility] for refill of rosuvastatin, Dr Yan no longer works at this clinic. PCP now listed as Vasiliy Estevez.

## 2024-10-08 RX ORDER — ROSUVASTATIN CALCIUM 5 MG/1
5 TABLET, COATED ORAL DAILY
Qty: 90 TABLET | Refills: 0 | Status: SHIPPED | OUTPATIENT
Start: 2024-10-08

## 2024-10-08 NOTE — TELEPHONE ENCOUNTER
1st Attempt -   [facility] to schedule annual physical with PCP. Medication send to pharmacy.     Ali   Visit Facilitator

## 2024-11-13 ENCOUNTER — PATIENT OUTREACH (OUTPATIENT)
Dept: CARE COORDINATION | Facility: CLINIC | Age: 64
End: 2024-11-13
Payer: COMMERCIAL

## 2024-12-18 ENCOUNTER — PATIENT OUTREACH (OUTPATIENT)
Dept: CARE COORDINATION | Facility: CLINIC | Age: 64
End: 2024-12-18
Payer: COMMERCIAL

## 2025-01-05 SDOH — HEALTH STABILITY: PHYSICAL HEALTH: ON AVERAGE, HOW MANY DAYS PER WEEK DO YOU ENGAGE IN MODERATE TO STRENUOUS EXERCISE (LIKE A BRISK WALK)?: 2 DAYS

## 2025-01-05 SDOH — HEALTH STABILITY: PHYSICAL HEALTH: ON AVERAGE, HOW MANY MINUTES DO YOU ENGAGE IN EXERCISE AT THIS LEVEL?: 30 MIN

## 2025-01-05 ASSESSMENT — SOCIAL DETERMINANTS OF HEALTH (SDOH): HOW OFTEN DO YOU GET TOGETHER WITH FRIENDS OR RELATIVES?: ONCE A WEEK

## 2025-01-08 ASSESSMENT — PATIENT HEALTH QUESTIONNAIRE - PHQ9
10. IF YOU CHECKED OFF ANY PROBLEMS, HOW DIFFICULT HAVE THESE PROBLEMS MADE IT FOR YOU TO DO YOUR WORK, TAKE CARE OF THINGS AT HOME, OR GET ALONG WITH OTHER PEOPLE: SOMEWHAT DIFFICULT
SUM OF ALL RESPONSES TO PHQ QUESTIONS 1-9: 7
SUM OF ALL RESPONSES TO PHQ QUESTIONS 1-9: 7

## 2025-01-09 ENCOUNTER — OFFICE VISIT (OUTPATIENT)
Dept: FAMILY MEDICINE | Facility: CLINIC | Age: 65
End: 2025-01-09
Payer: COMMERCIAL

## 2025-01-09 VITALS
OXYGEN SATURATION: 96 % | RESPIRATION RATE: 16 BRPM | TEMPERATURE: 97.4 F | BODY MASS INDEX: 26.86 KG/M2 | HEIGHT: 66 IN | SYSTOLIC BLOOD PRESSURE: 107 MMHG | WEIGHT: 167.1 LBS | DIASTOLIC BLOOD PRESSURE: 68 MMHG | HEART RATE: 64 BPM

## 2025-01-09 DIAGNOSIS — F41.1 GAD (GENERALIZED ANXIETY DISORDER): ICD-10-CM

## 2025-01-09 DIAGNOSIS — K59.00 CONSTIPATION, UNSPECIFIED CONSTIPATION TYPE: ICD-10-CM

## 2025-01-09 DIAGNOSIS — F33.42 RECURRENT MAJOR DEPRESSIVE DISORDER, IN FULL REMISSION: ICD-10-CM

## 2025-01-09 DIAGNOSIS — Z12.31 VISIT FOR SCREENING MAMMOGRAM: ICD-10-CM

## 2025-01-09 DIAGNOSIS — E78.5 HYPERLIPIDEMIA LDL GOAL <160: ICD-10-CM

## 2025-01-09 DIAGNOSIS — G47.00 INSOMNIA, UNSPECIFIED TYPE: ICD-10-CM

## 2025-01-09 DIAGNOSIS — Z00.00 ROUTINE GENERAL MEDICAL EXAMINATION AT A HEALTH CARE FACILITY: Primary | ICD-10-CM

## 2025-01-09 LAB
BASOPHILS # BLD AUTO: 0.1 10E3/UL (ref 0–0.2)
BASOPHILS NFR BLD AUTO: 1 %
EOSINOPHIL # BLD AUTO: 0.2 10E3/UL (ref 0–0.7)
EOSINOPHIL NFR BLD AUTO: 2 %
ERYTHROCYTE [DISTWIDTH] IN BLOOD BY AUTOMATED COUNT: 12.9 % (ref 10–15)
HCT VFR BLD AUTO: 45 % (ref 35–47)
HGB BLD-MCNC: 15 G/DL (ref 11.7–15.7)
IMM GRANULOCYTES # BLD: 0 10E3/UL
IMM GRANULOCYTES NFR BLD: 0 %
LYMPHOCYTES # BLD AUTO: 2.3 10E3/UL (ref 0.8–5.3)
LYMPHOCYTES NFR BLD AUTO: 23 %
MCH RBC QN AUTO: 28.4 PG (ref 26.5–33)
MCHC RBC AUTO-ENTMCNC: 33.3 G/DL (ref 31.5–36.5)
MCV RBC AUTO: 85 FL (ref 78–100)
MONOCYTES # BLD AUTO: 0.8 10E3/UL (ref 0–1.3)
MONOCYTES NFR BLD AUTO: 8 %
NEUTROPHILS # BLD AUTO: 6.7 10E3/UL (ref 1.6–8.3)
NEUTROPHILS NFR BLD AUTO: 66 %
PLATELET # BLD AUTO: 370 10E3/UL (ref 150–450)
RBC # BLD AUTO: 5.29 10E6/UL (ref 3.8–5.2)
WBC # BLD AUTO: 10 10E3/UL (ref 4–11)

## 2025-01-09 RX ORDER — TRAZODONE HYDROCHLORIDE 50 MG/1
TABLET, FILM COATED ORAL
Qty: 90 TABLET | Refills: 1 | Status: SHIPPED | OUTPATIENT
Start: 2025-01-09

## 2025-01-09 RX ORDER — ROSUVASTATIN CALCIUM 5 MG/1
5 TABLET, COATED ORAL DAILY
Qty: 90 TABLET | Refills: 3 | Status: SHIPPED | OUTPATIENT
Start: 2025-01-09

## 2025-01-09 ASSESSMENT — PAIN SCALES - GENERAL: PAINLEVEL_OUTOF10: NO PAIN (0)

## 2025-01-09 ASSESSMENT — ANXIETY QUESTIONNAIRES
GAD7 TOTAL SCORE: 7
IF YOU CHECKED OFF ANY PROBLEMS ON THIS QUESTIONNAIRE, HOW DIFFICULT HAVE THESE PROBLEMS MADE IT FOR YOU TO DO YOUR WORK, TAKE CARE OF THINGS AT HOME, OR GET ALONG WITH OTHER PEOPLE: SOMEWHAT DIFFICULT
1. FEELING NERVOUS, ANXIOUS, OR ON EDGE: SEVERAL DAYS
6. BECOMING EASILY ANNOYED OR IRRITABLE: SEVERAL DAYS
GAD7 TOTAL SCORE: 7
3. WORRYING TOO MUCH ABOUT DIFFERENT THINGS: SEVERAL DAYS
7. FEELING AFRAID AS IF SOMETHING AWFUL MIGHT HAPPEN: SEVERAL DAYS
2. NOT BEING ABLE TO STOP OR CONTROL WORRYING: SEVERAL DAYS
5. BEING SO RESTLESS THAT IT IS HARD TO SIT STILL: SEVERAL DAYS

## 2025-01-09 ASSESSMENT — PATIENT HEALTH QUESTIONNAIRE - PHQ9: 5. POOR APPETITE OR OVEREATING: SEVERAL DAYS

## 2025-01-09 NOTE — PROGRESS NOTES
"Preventive Care Visit  [facility] ROLA Estevez PA-C, Family Medicine  Jan 9, 2025    Assessment & Plan     Routine general medical examination at a health care facility  - Comprehensive metabolic panel (BMP + Alb, Alk Phos, ALT, AST, Total. Bili, TP); Future  - CBC with platelets and differential; Future  - Comprehensive metabolic panel (BMP + Alb, Alk Phos, ALT, AST, Total. Bili, TP)  - CBC with platelets and differential    Recurrent major depressive disorder, in full remission  Stable at this time     COLT (generalized anxiety disorder)  stable    Hyperlipidemia LDL goal <160  stable  - Lipid panel reflex to direct LDL Non-fasting; Future  - rosuvastatin (CRESTOR) 5 MG tablet; Take 1 tablet (5 mg) by mouth daily.  - Lipid panel reflex to direct LDL Non-fasting    Constipation, unspecified constipation type  Intermittent.  Discussed increasing fiber, water.  Ok for intermittent use of miralax    Insomnia, unspecified type  Would like to try medication for sleep.  Discussed uses of PRN trazodone.  Will begin trial and follow up as needed  - traZODone (DESYREL) 50 MG tablet; Take 25-50 mg as needed nightly for sleep    Visit for screening mammogram  - MA Screening Bilateral w/ Jovany; Future            BMI  Estimated body mass index is 26.95 kg/m  as calculated from the following:    Height as of this encounter: 1.677 m (5' 6.02\").    Weight as of this encounter: 75.8 kg (167 lb 1.6 oz).       Counseling  Appropriate preventive services were addressed with this patient via screening, questionnaire, or discussion as appropriate for fall prevention, nutrition, physical activity, Tobacco-use cessation, social engagement, weight loss and cognition.  Checklist reviewing preventive services available has been given to the patient.  Reviewed patient's diet, addressing concerns and/or questions.   She is at risk for lack of exercise and has been provided with information to increase " physical activity for the benefit of her well-being.   She is at risk for psychosocial distress and has been provided with information to reduce risk.   The patient's PHQ-9 score is consistent with mild depression. She was provided with information regarding depression.       Follow up anlan Leslie is a 64 year old, presenting for the following:  Physical        1/9/2025     3:26 PM   Additional Questions   Roomed by Dalila SULLIVAN    Pt states she is here for physical. Pt would like to talk about constipation. Mental Health. Would like to discuss the statin as well.       Depression and Anxiety   How are you doing with your depression since your last visit? Worsened- at times comes up and down   How are you doing with your anxiety since your last visit?  Worsened - at times comes up and down   Are you having other symptoms that might be associated with depression or anxiety? No  Have you had a significant life event? OTHER:  health has declined and she has being doing a lot for him and dog has been sick as well .     Do you have any concerns with your use of alcohol or other drugs? No    Social History     Tobacco Use    Smoking status: Never    Smokeless tobacco: Never   Vaping Use    Vaping status: Never Used   Substance Use Topics    Alcohol use: Not Currently    Drug use: Never         4/29/2022     3:53 PM 8/16/2023    12:50 PM 1/8/2025     4:23 PM   PHQ   PHQ-9 Total Score 0 0 7    Q9: Thoughts of better off dead/self-harm past 2 weeks Not at all Not at all Not at all       Patient-reported         6/22/2021     9:46 AM 1/9/2025     3:33 PM   COLT-7 SCORE   Total Score 0 (minimal anxiety)    Total Score 0 7         1/8/2025     4:23 PM   Last PHQ-9   1.  Little interest or pleasure in doing things 1   2.  Feeling down, depressed, or hopeless 1   3.  Trouble falling or staying asleep, or sleeping too much 1   4.  Feeling tired or having little energy 1   5.  Poor appetite or  overeating 1   6.  Feeling bad about yourself 1   7.  Trouble concentrating 1   8.  Moving slowly or restless 0   Q9: Thoughts of better off dead/self-harm past 2 weeks 0   PHQ-9 Total Score 7        Patient-reported         1/9/2025     3:33 PM   COLT-7    1. Feeling nervous, anxious, or on edge 1   2. Not being able to stop or control worrying 1   3. Worrying too much about different things 1   4. Trouble relaxing 1   5. Being so restless that it is hard to sit still 1   6. Becoming easily annoyed or irritable 1   7. Feeling afraid, as if something awful might happen 1   COLT-7 Total Score 7   If you checked any problems, how difficult have they made it for you to do your work, take care of things at home, or get along with other people? Somewhat difficult       Suicide Assessment Five-step Evaluation and Treatment (SAFE-T)    Constipation  Onset/Duration: For a few months   Description:  Frequency of bowel movements: if treva once a day    Consistency of stool: hard stool but not always   Progression of Symptoms: intermittent  Accompanying signs and symptoms:    Abdominal pain: No   Rectal pain: No   Blood in stool: No   Nausea/Vomiting: No   Weight loss or gain: No  History:   Similar problems in past: No  History of abdominal surgery: yes- appendix removed   Chronic laxative use: YES  New medications: No  Precipitating or alleviating factors: n/a  Therapies tried and outcome:         Health Care Directive  Patient does not have a Health Care Directive: Discussed advance care planning with patient; however, patient declined at this time.        1/5/2025   General Health   How would you rate your overall physical health? Excellent   Feel stress (tense, anxious, or unable to sleep) Rather much   (!) STRESS CONCERN      1/5/2025   Nutrition   Three or more servings of calcium each day? Yes   Diet: Regular (no restrictions)   How many servings of fruit and vegetables per day? (!) 2-3   How many sweetened beverages  each day? 0-1         1/5/2025   Exercise   Days per week of moderate/strenous exercise 2 days   Average minutes spent exercising at this level 30 min   (!) EXERCISE CONCERN      1/5/2025   Social Factors   Frequency of gathering with friends or relatives Once a week   Worry food won't last until get money to buy more No   Food not last or not have enough money for food? No   Do you have housing? (Housing is defined as stable permanent housing and does not include staying ouside in a car, in a tent, in an abandoned building, in an overnight shelter, or couch-surfing.) Yes   Are you worried about losing your housing? No   Lack of transportation? No   Unable to get utilities (heat,electricity)? No         1/5/2025   Fall Risk   Fallen 2 or more times in the past year? No   Trouble with walking or balance? No          1/5/2025   Dental   Dentist two times every year? Yes         1/5/2025   TB Screening   Were you born outside of the [place]? No       Today's PHQ-9 Score:       1/8/2025     4:23 PM   PHQ-9 SCORE   PHQ-9 Total Score MyChart 7 (Mild depression)   PHQ-9 Total Score 7        Patient-reported         1/5/2025   Substance Use   Alcohol more than 3/day or more than 7/wk No   Do you use any other substances recreationally? No     Social History     Tobacco Use    Smoking status: Never    Smokeless tobacco: Never   Vaping Use    Vaping status: Never Used   Substance Use Topics    Alcohol use: Not Currently    Drug use: Never         1/17/2024   LAST FHS-7 RESULTS   1st degree relative breast or ovarian cancer No   Any relative bilateral breast cancer No   Any male have breast cancer No   Any ONE woman have BOTH breast AND ovarian cancer No   Any woman with breast cancer before 50yrs No   2 or more relatives with breast AND/OR ovarian cancer No   2 or more relatives with breast AND/OR bowel cancer No       Mammogram Screening - Annual screen due to greater than 20% lifetime risk as estimated by Breast Cancer Risk  "Calculator        1/5/2025   STI Screening   New sexual partner(s) since last STI/HIV test? No     History of abnormal Pap smear: No - age 30- 64 PAP with HPV every 5 years recommended        Latest Ref Rng & Units 3/10/2021     3:25 PM 3/10/2021     3:23 PM 8/28/2017    12:00 AM   PAP / HPV   PAP (Historical)   NIL     HPV 16 DNA NEG^Negative Negative      HPV 18 DNA NEG^Negative Negative      Other HR HPV NEG^Negative Negative      PAP-ABSTRACT    See Scanned Document           This result is from an external source.     ASCVD Risk   The 10-year ASCVD risk score (Pj MOORE, et al., 2019) is: 3.1%    Values used to calculate the score:      Age: 64 years      Sex: Female      Is Non- : No      Diabetic: No      Tobacco smoker: No      Systolic Blood Pressure: 107 mmHg      Is BP treated: No      HDL Cholesterol: 54 mg/dL      Total Cholesterol: 187 mg/dL      Reviewed and updated as needed this visit by Provider   Tobacco    Problems  Med Hx  Surg Hx  Fam Hx            Past Medical History:   Diagnosis Date    Depressive disorder     HLD (hyperlipidemia)     Insomnia      Past Surgical History:   Procedure Laterality Date    BLEPHAROPLASTY BILATERAL Bilateral 7/6/2021    Procedure: BILATERAL UPPER LID BLEPHAROPLASTY;  Surgeon: Tania Salguero MD;  Location:  OR    LAPAROSCOPIC APPENDECTOMY N/A 1/29/2021    Procedure: APPENDECTOMY, LAPAROSCOPIC;  Surgeon: Linden Mcdonald MD;  Location: [facility] OR    wisdom teeth removed       OB History   No obstetric history on file.         Review of Systems  Constitutional, HEENT, cardiovascular, pulmonary, GI, , musculoskeletal, neuro, skin, endocrine and psych systems are negative, except as otherwise noted.     Objective    Exam  /68 (BP Location: Left arm, Patient Position: Sitting, Cuff Size: Adult Large)   Pulse 64   Temp 97.4  F (36.3  C) (Tympanic)   Resp 16   Ht 1.677 m (5' 6.02\")   Wt 75.8 kg (167 lb 1.6 oz)  " " SpO2 96%   BMI 26.95 kg/m     Estimated body mass index is 26.95 kg/m  as calculated from the following:    Height as of this encounter: 1.677 m (5' 6.02\").    Weight as of this encounter: 75.8 kg (167 lb 1.6 oz).    Physical Exam  GENERAL: alert and no distress  EYES: Eyes grossly normal to inspection, PERRL and conjunctivae and sclerae normal  HENT: ear canals and TM's normal, nose and mouth without ulcers or lesions  NECK: no adenopathy, no asymmetry, masses, or scars  RESP: lungs clear to auscultation - no rales, rhonchi or wheezes  BREAST: normal without masses, tenderness or nipple discharge and no palpable axillary masses or adenopathy  CV: regular rate and rhythm, normal S1 S2, no S3 or S4, no murmur, click or rub, no peripheral edema   ABDOMEN: soft, nontender, no hepatosplenomegaly, no masses and bowel sounds normal  MS: no gross musculoskeletal defects noted, no edema  SKIN: no suspicious lesions or rashes  PSYCH: mentation appears normal, affect normal/bright        Signed Electronically by: Vasiliy Estevez PA-C    Answers submitted by the patient for this visit:  Patient Health Questionnaire (Submitted on 1/8/2025)  If you checked off any problems, how difficult have these problems made it for you to do your work, take care of things at home, or get along with other people?: Somewhat difficult  PHQ9 TOTAL SCORE: 7    "

## 2025-01-10 LAB
ALBUMIN SERPL BCG-MCNC: 4.5 G/DL (ref 3.5–5.2)
ALP SERPL-CCNC: 77 U/L (ref 40–150)
ALT SERPL W P-5'-P-CCNC: 18 U/L (ref 0–50)
ANION GAP SERPL CALCULATED.3IONS-SCNC: 11 MMOL/L (ref 7–15)
AST SERPL W P-5'-P-CCNC: 21 U/L (ref 0–45)
BILIRUB SERPL-MCNC: 0.2 MG/DL
BUN SERPL-MCNC: 20.7 MG/DL (ref 8–23)
CALCIUM SERPL-MCNC: 10 MG/DL (ref 8.8–10.4)
CHLORIDE SERPL-SCNC: 103 MMOL/L (ref 98–107)
CHOLEST SERPL-MCNC: 164 MG/DL
CREAT SERPL-MCNC: 0.98 MG/DL (ref 0.51–0.95)
EGFRCR SERPLBLD CKD-EPI 2021: 65 ML/MIN/1.73M2
FASTING STATUS PATIENT QL REPORTED: ABNORMAL
FASTING STATUS PATIENT QL REPORTED: ABNORMAL
GLUCOSE SERPL-MCNC: 87 MG/DL (ref 70–99)
HCO3 SERPL-SCNC: 27 MMOL/L (ref 22–29)
HDLC SERPL-MCNC: 52 MG/DL
LDLC SERPL CALC-MCNC: 76 MG/DL
NONHDLC SERPL-MCNC: 112 MG/DL
POTASSIUM SERPL-SCNC: 4.2 MMOL/L (ref 3.4–5.3)
PROT SERPL-MCNC: 7 G/DL (ref 6.4–8.3)
SODIUM SERPL-SCNC: 141 MMOL/L (ref 135–145)
TRIGL SERPL-MCNC: 178 MG/DL

## 2025-01-15 ENCOUNTER — PATIENT OUTREACH (OUTPATIENT)
Dept: CARE COORDINATION | Facility: CLINIC | Age: 65
End: 2025-01-15
Payer: COMMERCIAL

## 2025-01-15 NOTE — RESULT ENCOUNTER NOTE
Mariama-  Here are your recent results.     Your labs look normal with the exception of your kidney function which is minimally reduced.  Staying well hydrated and avoiding medications that can damage your kidneys can help. Your triglycerides are elevated.  Eating a healthy diet and getting regular exercise can help.    We can recheck this next year.     Vasiliy

## 2025-03-29 ENCOUNTER — HEALTH MAINTENANCE LETTER (OUTPATIENT)
Age: 65
End: 2025-03-29

## 2025-07-07 ASSESSMENT — ANXIETY QUESTIONNAIRES
1. FEELING NERVOUS, ANXIOUS, OR ON EDGE: SEVERAL DAYS
7. FEELING AFRAID AS IF SOMETHING AWFUL MIGHT HAPPEN: SEVERAL DAYS
4. TROUBLE RELAXING: SEVERAL DAYS
GAD7 TOTAL SCORE: 6
GAD7 TOTAL SCORE: 6
2. NOT BEING ABLE TO STOP OR CONTROL WORRYING: SEVERAL DAYS
6. BECOMING EASILY ANNOYED OR IRRITABLE: SEVERAL DAYS
IF YOU CHECKED OFF ANY PROBLEMS ON THIS QUESTIONNAIRE, HOW DIFFICULT HAVE THESE PROBLEMS MADE IT FOR YOU TO DO YOUR WORK, TAKE CARE OF THINGS AT HOME, OR GET ALONG WITH OTHER PEOPLE: SOMEWHAT DIFFICULT
3. WORRYING TOO MUCH ABOUT DIFFERENT THINGS: SEVERAL DAYS
7. FEELING AFRAID AS IF SOMETHING AWFUL MIGHT HAPPEN: SEVERAL DAYS
8. IF YOU CHECKED OFF ANY PROBLEMS, HOW DIFFICULT HAVE THESE MADE IT FOR YOU TO DO YOUR WORK, TAKE CARE OF THINGS AT HOME, OR GET ALONG WITH OTHER PEOPLE?: SOMEWHAT DIFFICULT
GAD7 TOTAL SCORE: 6
5. BEING SO RESTLESS THAT IT IS HARD TO SIT STILL: NOT AT ALL

## 2025-07-09 ENCOUNTER — OFFICE VISIT (OUTPATIENT)
Dept: FAMILY MEDICINE | Facility: CLINIC | Age: 65
End: 2025-07-09
Payer: COMMERCIAL

## 2025-07-09 VITALS
DIASTOLIC BLOOD PRESSURE: 70 MMHG | HEART RATE: 66 BPM | WEIGHT: 169 LBS | BODY MASS INDEX: 27.16 KG/M2 | RESPIRATION RATE: 14 BRPM | HEIGHT: 66 IN | SYSTOLIC BLOOD PRESSURE: 112 MMHG | TEMPERATURE: 97 F | OXYGEN SATURATION: 98 %

## 2025-07-09 DIAGNOSIS — F41.1 GAD (GENERALIZED ANXIETY DISORDER): Primary | ICD-10-CM

## 2025-07-09 DIAGNOSIS — G47.00 INSOMNIA, UNSPECIFIED TYPE: ICD-10-CM

## 2025-07-09 PROCEDURE — 3078F DIAST BP <80 MM HG: CPT | Performed by: PHYSICIAN ASSISTANT

## 2025-07-09 PROCEDURE — 3074F SYST BP LT 130 MM HG: CPT | Performed by: PHYSICIAN ASSISTANT

## 2025-07-09 PROCEDURE — 99214 OFFICE O/P EST MOD 30 MIN: CPT | Performed by: PHYSICIAN ASSISTANT

## 2025-07-09 PROCEDURE — 1126F AMNT PAIN NOTED NONE PRSNT: CPT | Performed by: PHYSICIAN ASSISTANT

## 2025-07-09 RX ORDER — FLUOXETINE 10 MG/1
10 CAPSULE ORAL DAILY
Qty: 90 CAPSULE | Refills: 1 | Status: SHIPPED | OUTPATIENT
Start: 2025-07-09

## 2025-07-09 ASSESSMENT — PATIENT HEALTH QUESTIONNAIRE - PHQ9
SUM OF ALL RESPONSES TO PHQ QUESTIONS 1-9: 7
SUM OF ALL RESPONSES TO PHQ QUESTIONS 1-9: 7
10. IF YOU CHECKED OFF ANY PROBLEMS, HOW DIFFICULT HAVE THESE PROBLEMS MADE IT FOR YOU TO DO YOUR WORK, TAKE CARE OF THINGS AT HOME, OR GET ALONG WITH OTHER PEOPLE: SOMEWHAT DIFFICULT

## 2025-07-09 ASSESSMENT — PAIN SCALES - GENERAL: PAINLEVEL_OUTOF10: NO PAIN (0)

## 2025-07-09 NOTE — PROGRESS NOTES
"  Assessment & Plan     COLT (generalized anxiety disorder)  She would benefit from trial of selective serotonin reuptake inhibitor for anxiety.  She will continue trazodone for now for sleep, but could reconsider a higher dose in the future   - FLUoxetine (PROZAC) 10 MG capsule; Take 1 capsule (10 mg) by mouth daily.    Insomnia, unspecified type  Continue trazodone    BMI  Estimated body mass index is 27.28 kg/m  as calculated from the following:    Height as of this encounter: 1.676 m (5' 6\").    Weight as of this encounter: 76.7 kg (169 lb).       Follow up in 4 weeks for med check    Subjective   Mariama is a 64 year old, presenting for the following health issues:   Follow Up        7/9/2025     9:27 AM   Additional Questions   Roomed by Altaf       Via the Health Maintenance questionnaire, the patient has reported the following services have been completed -Mammogram: [place] 2024-01-17, this information has not been sent to the abstraction team.  History of Present Illness       Mental Health Follow-up:  Patient presents to follow-up on Depression & Anxiety.Patient's depression since last visit has been:  Medium  The patient is having other symptoms associated with depression.  Patient's anxiety since last visit has been:  Medium  The patient is having other symptoms associated with anxiety.  Any significant life events: grief or loss and other  Patient is feeling anxious or having panic attacks.  Patient has no concerns about alcohol or drug use.    She eats 2-3 servings of fruits and vegetables daily.She consumes 0 sweetened beverage(s) daily.She exercises with enough effort to increase her heart rate 9 or less minutes per day.  She exercises with enough effort to increase her heart rate 3 or less days per week.   She is taking medications regularly.        Mariama has been struggling recently with sleep and with anxiety.  Over the past year, her  has endured some health problems and her family dog " "passed away.  She is feeling very \" fragile\" recently and having a hard time enjoying things, feeling overwhelmed. In the past she was taking prozac 20 mg daily and this was helpful.  She in interested in trying this again    Social History     Tobacco Use    Smoking status: Never    Smokeless tobacco: Never   Vaping Use    Vaping status: Never Used   Substance Use Topics    Alcohol use: Not Currently    Drug use: Never         8/16/2023    12:50 PM 1/8/2025     4:23 PM 7/9/2025     9:12 AM   PHQ   PHQ-9 Total Score 0 7  7    Q9: Thoughts of better off dead/self-harm past 2 weeks Not at all Not at all Not at all       Patient-reported         6/22/2021     9:46 AM 1/9/2025     3:33 PM 7/7/2025     8:31 AM   COLT-7 SCORE   Total Score 0 (minimal anxiety)  6 (mild anxiety)   Total Score 0 7 6        Patient-reported         7/9/2025     9:12 AM   Last PHQ-9   1.  Little interest or pleasure in doing things 1   2.  Feeling down, depressed, or hopeless 1   3.  Trouble falling or staying asleep, or sleeping too much 1   4.  Feeling tired or having little energy 1   5.  Poor appetite or overeating 1   6.  Feeling bad about yourself 1   7.  Trouble concentrating 1   8.  Moving slowly or restless 0   Q9: Thoughts of better off dead/self-harm past 2 weeks 0   PHQ-9 Total Score 7        Patient-reported         7/7/2025     8:31 AM   COLT-7    1. Feeling nervous, anxious, or on edge 1   2. Not being able to stop or control worrying 1   3. Worrying too much about different things 1   4. Trouble relaxing 1   5. Being so restless that it is hard to sit still 0   6. Becoming easily annoyed or irritable 1   7. Feeling afraid, as if something awful might happen 1   COLT-7 Total Score 6    If you checked any problems, how difficult have they made it for you to do your work, take care of things at home, or get along with other people? Somewhat difficult       Patient-reported           Review of Systems  Constitutional, HEENT, " "cardiovascular, pulmonary, GI, , musculoskeletal, neuro, skin, endocrine and psych systems are negative, except as otherwise noted.      Objective    /70   Pulse 86   Temp 97  F (36.1  C) (Temporal)   Resp 14   Ht 1.676 m (5' 6\")   Wt 76.7 kg (169 lb)   SpO2 98%   BMI 27.28 kg/m    Body mass index is 27.28 kg/m .  Physical Exam   GENERAL: alert and no distress  PSYCH: mentation appears normal, affect normal/bright            Signed Electronically by: Vasiliy Estevez PA-C    "

## 2025-07-16 ENCOUNTER — PATIENT OUTREACH (OUTPATIENT)
Dept: CARE COORDINATION | Facility: CLINIC | Age: 65
End: 2025-07-16
Payer: COMMERCIAL

## 2025-07-29 ENCOUNTER — HOSPITAL ENCOUNTER (OUTPATIENT)
Dept: MAMMOGRAPHY | Facility: CLINIC | Age: 65
Discharge: HOME | End: 2025-07-29
Attending: PHYSICIAN ASSISTANT
Payer: COMMERCIAL

## 2025-07-29 DIAGNOSIS — Z12.31 VISIT FOR SCREENING MAMMOGRAM: ICD-10-CM

## 2025-07-29 PROCEDURE — 77067 SCR MAMMO BI INCL CAD: CPT

## (undated) DEVICE — LINEN TOWEL PACK X5 5464

## (undated) DEVICE — ESU PENCIL W/SMOKE EVAC CVPLP2000

## (undated) DEVICE — DRSG STERI STRIP 1/2X4" R1547

## (undated) DEVICE — SU CHROMIC 6-0 G-1 18" 790G

## (undated) DEVICE — SUCTION IRR STRYKERFLOW II W/TIP 250-070-520

## (undated) DEVICE — DECANTER VIAL 2006S

## (undated) DEVICE — SOL WATER IRRIG 1000ML BOTTLE 2F7114

## (undated) DEVICE — GLOVE GAMMEX DERMAPRENE ULTRA SZ 8 LF 8516

## (undated) DEVICE — ESU NDL COLORADO MICRO 3CM STR N103A

## (undated) DEVICE — SU VICRYL 0 UR-6 27" J603H

## (undated) DEVICE — PACK LAP CHOLE SLC15LCFSD

## (undated) DEVICE — PACK OCULOPLATIC SEN15OCFSD

## (undated) DEVICE — STPL ENDO ARTICULATING 45MM EC45A

## (undated) DEVICE — ENDO SCOPE WARMER LF TM500

## (undated) DEVICE — ENDO TROCAR FIRST ENTRY KII FIOS Z-THRD 12X100MM CTF73

## (undated) DEVICE — SOL NACL 0.9% INJ 1000ML BAG 2B1324X

## (undated) DEVICE — EYE PREP BETADINE 5% SOLUTION 30ML 0065-0411-30

## (undated) DEVICE — Device

## (undated) DEVICE — ENDO POUCH UNIV RETRIEVAL SYSTEM INZII 10MM CD001

## (undated) DEVICE — ESU HOLDER LAP INST DISP PURPLE LONG 330MM H-PRO-330

## (undated) DEVICE — SUCTION CANISTER MEDIVAC LINER 3000ML W/LID 65651-530

## (undated) DEVICE — SU PROLENE 6-0 P-1 18" 8697G

## (undated) DEVICE — SOL NACL 0.9% IRRIG 1000ML BOTTLE 2F7124

## (undated) DEVICE — ENDO TROCAR SLEEVE KII Z-THREADED 05X100MM CTS02

## (undated) DEVICE — DRSG BANDAID 1X3" FABRIC CURITY LATEX FREE KC44101

## (undated) DEVICE — ESU GROUND PAD UNIVERSAL W/O CORD

## (undated) DEVICE — SU MONOCRYL 4-0 PS-2 18" UND Y496G

## (undated) DEVICE — GLOVE PROTEXIS W/NEU-THERA 7.5  2D73TE75

## (undated) DEVICE — ENDO TROCAR FIRST ENTRY KII FIOS Z-THRD 05X100MM CTF03

## (undated) DEVICE — STPL RELOAD REG TISSUE ECHELON 45 X 3.6MM BLUE GST45B

## (undated) DEVICE — PREP CHLORAPREP 26ML TINTED ORANGE  260815

## (undated) DEVICE — GLOVE PROTEXIS MICRO 6.5  2D73PM65

## (undated) RX ORDER — LIDOCAINE HYDROCHLORIDE 20 MG/ML
INJECTION, SOLUTION EPIDURAL; INFILTRATION; INTRACAUDAL; PERINEURAL
Status: DISPENSED
Start: 2021-01-29

## (undated) RX ORDER — FENTANYL CITRATE 50 UG/ML
INJECTION, SOLUTION INTRAMUSCULAR; INTRAVENOUS
Status: DISPENSED
Start: 2021-01-29

## (undated) RX ORDER — PROPOFOL 10 MG/ML
INJECTION, EMULSION INTRAVENOUS
Status: DISPENSED
Start: 2021-01-29

## (undated) RX ORDER — ONDANSETRON 2 MG/ML
INJECTION INTRAMUSCULAR; INTRAVENOUS
Status: DISPENSED
Start: 2021-01-29

## (undated) RX ORDER — FENTANYL CITRATE 0.05 MG/ML
INJECTION, SOLUTION INTRAMUSCULAR; INTRAVENOUS
Status: DISPENSED
Start: 2021-01-29

## (undated) RX ORDER — FENTANYL CITRATE 50 UG/ML
INJECTION, SOLUTION INTRAMUSCULAR; INTRAVENOUS
Status: DISPENSED
Start: 2021-07-06

## (undated) RX ORDER — GLYCOPYRROLATE 0.2 MG/ML
INJECTION, SOLUTION INTRAMUSCULAR; INTRAVENOUS
Status: DISPENSED
Start: 2021-01-29

## (undated) RX ORDER — OXYCODONE HYDROCHLORIDE 5 MG/1
TABLET ORAL
Status: DISPENSED
Start: 2021-01-29

## (undated) RX ORDER — DEXAMETHASONE SODIUM PHOSPHATE 4 MG/ML
INJECTION, SOLUTION INTRA-ARTICULAR; INTRALESIONAL; INTRAMUSCULAR; INTRAVENOUS; SOFT TISSUE
Status: DISPENSED
Start: 2021-01-29

## (undated) RX ORDER — ONDANSETRON 2 MG/ML
INJECTION INTRAMUSCULAR; INTRAVENOUS
Status: DISPENSED
Start: 2021-07-06

## (undated) RX ORDER — BUPIVACAINE HYDROCHLORIDE AND EPINEPHRINE 5; 5 MG/ML; UG/ML
INJECTION, SOLUTION EPIDURAL; INTRACAUDAL; PERINEURAL
Status: DISPENSED
Start: 2021-01-29

## (undated) RX ORDER — LIDOCAINE HYDROCHLORIDE 20 MG/ML
INJECTION, SOLUTION EPIDURAL; INFILTRATION; INTRACAUDAL; PERINEURAL
Status: DISPENSED
Start: 2021-07-06

## (undated) RX ORDER — NEOSTIGMINE METHYLSULFATE 1 MG/ML
VIAL (ML) INJECTION
Status: DISPENSED
Start: 2021-01-29